# Patient Record
Sex: FEMALE | Race: WHITE | NOT HISPANIC OR LATINO | Employment: OTHER | URBAN - METROPOLITAN AREA
[De-identification: names, ages, dates, MRNs, and addresses within clinical notes are randomized per-mention and may not be internally consistent; named-entity substitution may affect disease eponyms.]

---

## 2017-11-03 ENCOUNTER — TRANSCRIBE ORDERS (OUTPATIENT)
Dept: ADMINISTRATIVE | Facility: HOSPITAL | Age: 67
End: 2017-11-03

## 2017-11-03 DIAGNOSIS — R41.82 ALTERED MENTAL STATUS, UNSPECIFIED ALTERED MENTAL STATUS TYPE: Primary | ICD-10-CM

## 2017-11-09 ENCOUNTER — APPOINTMENT (EMERGENCY)
Dept: RADIOLOGY | Facility: HOSPITAL | Age: 67
End: 2017-11-09
Payer: MEDICARE

## 2017-11-09 ENCOUNTER — HOSPITAL ENCOUNTER (EMERGENCY)
Facility: HOSPITAL | Age: 67
End: 2017-11-09
Attending: EMERGENCY MEDICINE | Admitting: EMERGENCY MEDICINE
Payer: MEDICARE

## 2017-11-09 ENCOUNTER — HOSPITAL ENCOUNTER (INPATIENT)
Facility: HOSPITAL | Age: 67
LOS: 4 days | DRG: 054 | End: 2017-11-13
Attending: INTERNAL MEDICINE | Admitting: HOSPITALIST
Payer: MEDICARE

## 2017-11-09 ENCOUNTER — HOSPITAL ENCOUNTER (OUTPATIENT)
Dept: RADIOLOGY | Facility: HOSPITAL | Age: 67
Discharge: HOME | End: 2017-11-09
Attending: FAMILY MEDICINE
Payer: MEDICARE

## 2017-11-09 VITALS
DIASTOLIC BLOOD PRESSURE: 74 MMHG | OXYGEN SATURATION: 93 % | SYSTOLIC BLOOD PRESSURE: 176 MMHG | WEIGHT: 173 LBS | HEART RATE: 78 BPM | RESPIRATION RATE: 18 BRPM | BODY MASS INDEX: 31.83 KG/M2 | TEMPERATURE: 98.1 F | HEIGHT: 62 IN

## 2017-11-09 DIAGNOSIS — E87.0 HYPERNATREMIA: ICD-10-CM

## 2017-11-09 DIAGNOSIS — R41.82 ALTERED MENTAL STATUS, UNSPECIFIED ALTERED MENTAL STATUS TYPE: ICD-10-CM

## 2017-11-09 DIAGNOSIS — G93.89 BRAIN MASS: Primary | ICD-10-CM

## 2017-11-09 DIAGNOSIS — R13.10 DYSPHAGIA: ICD-10-CM

## 2017-11-09 DIAGNOSIS — I47.2 VENTRICULAR TACHYARRHYTHMIA (HCC): ICD-10-CM

## 2017-11-09 DIAGNOSIS — R93.89 ENDOMETRIAL THICKENING ON ULTRA SOUND: ICD-10-CM

## 2017-11-09 DIAGNOSIS — I50.30 DIASTOLIC HEART FAILURE, UNSPECIFIED HEART FAILURE CHRONICITY: Chronic | ICD-10-CM

## 2017-11-09 PROBLEM — E66.9 CLASS 1 OBESITY IN ADULT: Chronic | Status: ACTIVE | Noted: 2017-11-09

## 2017-11-09 PROBLEM — I10 ACCELERATED HYPERTENSION: Status: ACTIVE | Noted: 2017-11-09

## 2017-11-09 PROBLEM — G93.40 ACUTE ENCEPHALOPATHY: Status: ACTIVE | Noted: 2017-11-09

## 2017-11-09 PROBLEM — R29.810 FACIAL DROOP: Status: ACTIVE | Noted: 2017-11-09

## 2017-11-09 PROBLEM — E66.9 CLASS 1 OBESITY IN ADULT: Status: ACTIVE | Noted: 2017-11-09

## 2017-11-09 LAB
ABO GROUP BLD: NORMAL
ALBUMIN SERPL BCP-MCNC: 3.3 G/DL (ref 3.5–5)
ALP SERPL-CCNC: 65 U/L (ref 46–116)
ALT SERPL W P-5'-P-CCNC: 25 U/L (ref 12–78)
ANION GAP SERPL CALCULATED.3IONS-SCNC: 9 MMOL/L (ref 4–13)
APTT PPP: 28 SECONDS (ref 24–33)
AST SERPL W P-5'-P-CCNC: 27 U/L (ref 5–45)
BASOPHILS # BLD AUTO: 0 THOUSANDS/ΜL (ref 0–0.1)
BASOPHILS NFR BLD AUTO: 0 % (ref 0–1)
BILIRUB SERPL-MCNC: 0.8 MG/DL (ref 0.2–1)
BLD GP AB SCN SERPL QL: NEGATIVE
BUN SERPL-MCNC: 33 MG/DL (ref 5–25)
CALCIUM SERPL-MCNC: 9.2 MG/DL (ref 8.3–10.1)
CHLORIDE SERPL-SCNC: 118 MMOL/L (ref 100–108)
CO2 SERPL-SCNC: 24 MMOL/L (ref 21–32)
CREAT SERPL-MCNC: 1.44 MG/DL (ref 0.6–1.3)
EOSINOPHIL # BLD AUTO: 0.7 THOUSAND/ΜL (ref 0–0.61)
EOSINOPHIL NFR BLD AUTO: 10 % (ref 0–6)
ERYTHROCYTE [DISTWIDTH] IN BLOOD BY AUTOMATED COUNT: 15.3 % (ref 11.6–15.1)
GFR SERPL CREATININE-BSD FRML MDRD: 37 ML/MIN/1.73SQ M
GLUCOSE SERPL-MCNC: 109 MG/DL (ref 65–140)
GLUCOSE SERPL-MCNC: 95 MG/DL (ref 65–140)
HCT VFR BLD AUTO: 40.2 % (ref 37–47)
HGB BLD-MCNC: 12.9 G/DL (ref 12–16)
INR PPP: 1.16 (ref 0.86–1.16)
LACTATE SERPL-SCNC: 0.6 MMOL/L (ref 0.5–2)
LIPASE SERPL-CCNC: 274 U/L (ref 73–393)
LYMPHOCYTES # BLD AUTO: 0.7 THOUSANDS/ΜL (ref 0.6–4.47)
LYMPHOCYTES NFR BLD AUTO: 10 % (ref 14–44)
MCH RBC QN AUTO: 28.4 PG (ref 27–31)
MCHC RBC AUTO-ENTMCNC: 32 G/DL (ref 31.4–37.4)
MCV RBC AUTO: 89 FL (ref 82–98)
MONOCYTES # BLD AUTO: 0.5 THOUSAND/ΜL (ref 0.17–1.22)
MONOCYTES NFR BLD AUTO: 6 % (ref 4–12)
NEUTROPHILS # BLD AUTO: 5.2 THOUSANDS/ΜL (ref 1.85–7.62)
NEUTS SEG NFR BLD AUTO: 73 % (ref 43–75)
NRBC BLD AUTO-RTO: 0 /100 WBCS
PLATELET # BLD AUTO: 202 THOUSANDS/UL (ref 130–400)
PMV BLD AUTO: 9.4 FL (ref 8.9–12.7)
POTASSIUM SERPL-SCNC: 3.6 MMOL/L (ref 3.5–5.3)
PROT SERPL-MCNC: 6.8 G/DL (ref 6.4–8.2)
PROTHROMBIN TIME: 12.2 SECONDS (ref 9.4–11.7)
RBC # BLD AUTO: 4.53 MILLION/UL (ref 4.2–5.4)
RH BLD: POSITIVE
SODIUM SERPL-SCNC: 151 MMOL/L (ref 136–145)
SPECIMEN EXPIRATION DATE: NORMAL
T4 FREE SERPL-MCNC: 1.17 NG/DL (ref 0.76–1.46)
TSH SERPL DL<=0.05 MIU/L-ACNC: 0.1 UIU/ML (ref 0.36–3.74)
WBC # BLD AUTO: 7.1 THOUSAND/UL (ref 4.8–10.8)

## 2017-11-09 PROCEDURE — 86850 RBC ANTIBODY SCREEN: CPT | Performed by: EMERGENCY MEDICINE

## 2017-11-09 PROCEDURE — 85610 PROTHROMBIN TIME: CPT | Performed by: EMERGENCY MEDICINE

## 2017-11-09 PROCEDURE — 96374 THER/PROPH/DIAG INJ IV PUSH: CPT

## 2017-11-09 PROCEDURE — 70553 MRI BRAIN STEM W/O & W/DYE: CPT

## 2017-11-09 PROCEDURE — 82948 REAGENT STRIP/BLOOD GLUCOSE: CPT

## 2017-11-09 PROCEDURE — 71010 HB CHEST X-RAY 1 VIEW FRONTAL (PORTABLE): CPT

## 2017-11-09 PROCEDURE — 85025 COMPLETE CBC W/AUTO DIFF WBC: CPT | Performed by: EMERGENCY MEDICINE

## 2017-11-09 PROCEDURE — 80053 COMPREHEN METABOLIC PANEL: CPT | Performed by: EMERGENCY MEDICINE

## 2017-11-09 PROCEDURE — 83690 ASSAY OF LIPASE: CPT | Performed by: EMERGENCY MEDICINE

## 2017-11-09 PROCEDURE — 84443 ASSAY THYROID STIM HORMONE: CPT | Performed by: EMERGENCY MEDICINE

## 2017-11-09 PROCEDURE — 83605 ASSAY OF LACTIC ACID: CPT | Performed by: EMERGENCY MEDICINE

## 2017-11-09 PROCEDURE — 70450 CT HEAD/BRAIN W/O DYE: CPT

## 2017-11-09 PROCEDURE — 93005 ELECTROCARDIOGRAM TRACING: CPT | Performed by: EMERGENCY MEDICINE

## 2017-11-09 PROCEDURE — 99285 EMERGENCY DEPT VISIT HI MDM: CPT

## 2017-11-09 PROCEDURE — 71250 CT THORAX DX C-: CPT

## 2017-11-09 PROCEDURE — 36415 COLL VENOUS BLD VENIPUNCTURE: CPT | Performed by: EMERGENCY MEDICINE

## 2017-11-09 PROCEDURE — 96361 HYDRATE IV INFUSION ADD-ON: CPT

## 2017-11-09 PROCEDURE — 86900 BLOOD TYPING SEROLOGIC ABO: CPT | Performed by: EMERGENCY MEDICINE

## 2017-11-09 PROCEDURE — 84439 ASSAY OF FREE THYROXINE: CPT | Performed by: EMERGENCY MEDICINE

## 2017-11-09 PROCEDURE — 74176 CT ABD & PELVIS W/O CONTRAST: CPT

## 2017-11-09 PROCEDURE — A9585 GADOBUTROL INJECTION: HCPCS | Performed by: EMERGENCY MEDICINE

## 2017-11-09 PROCEDURE — 86901 BLOOD TYPING SEROLOGIC RH(D): CPT | Performed by: EMERGENCY MEDICINE

## 2017-11-09 PROCEDURE — 85730 THROMBOPLASTIN TIME PARTIAL: CPT | Performed by: EMERGENCY MEDICINE

## 2017-11-09 RX ORDER — GLYCOPYRROLATE 1 MG/5ML
SOLUTION ORAL 3 TIMES DAILY
COMMUNITY
End: 2017-11-13 | Stop reason: HOSPADM

## 2017-11-09 RX ORDER — ALLOPURINOL 300 MG/1
300 TABLET ORAL DAILY
Status: DISCONTINUED | OUTPATIENT
Start: 2017-11-10 | End: 2017-11-13 | Stop reason: HOSPADM

## 2017-11-09 RX ORDER — SODIUM CHLORIDE 9 MG/ML
75 INJECTION, SOLUTION INTRAVENOUS CONTINUOUS
Status: DISCONTINUED | OUTPATIENT
Start: 2017-11-09 | End: 2017-11-10

## 2017-11-09 RX ORDER — DEXAMETHASONE SODIUM PHOSPHATE 10 MG/ML
10 INJECTION, SOLUTION INTRAMUSCULAR; INTRAVENOUS ONCE
Status: COMPLETED | OUTPATIENT
Start: 2017-11-09 | End: 2017-11-09

## 2017-11-09 RX ORDER — LABETALOL HYDROCHLORIDE 5 MG/ML
10 INJECTION, SOLUTION INTRAVENOUS EVERY 4 HOURS PRN
Status: DISCONTINUED | OUTPATIENT
Start: 2017-11-09 | End: 2017-11-13 | Stop reason: HOSPADM

## 2017-11-09 RX ORDER — MONTELUKAST SODIUM 10 MG/1
10 TABLET ORAL
Status: DISCONTINUED | OUTPATIENT
Start: 2017-11-09 | End: 2017-11-10

## 2017-11-09 RX ORDER — DEXAMETHASONE SODIUM PHOSPHATE 4 MG/ML
4 INJECTION, SOLUTION INTRA-ARTICULAR; INTRALESIONAL; INTRAMUSCULAR; INTRAVENOUS; SOFT TISSUE EVERY 6 HOURS SCHEDULED
Status: DISPENSED | OUTPATIENT
Start: 2017-11-09 | End: 2017-11-12

## 2017-11-09 RX ORDER — CALCITRIOL 0.25 UG/1
0.25 CAPSULE, LIQUID FILLED ORAL DAILY
Status: DISCONTINUED | OUTPATIENT
Start: 2017-11-10 | End: 2017-11-13 | Stop reason: HOSPADM

## 2017-11-09 RX ORDER — ONDANSETRON 2 MG/ML
4 INJECTION INTRAMUSCULAR; INTRAVENOUS EVERY 4 HOURS PRN
Status: DISCONTINUED | OUTPATIENT
Start: 2017-11-09 | End: 2017-11-13 | Stop reason: HOSPADM

## 2017-11-09 RX ORDER — ACETAMINOPHEN 325 MG/1
650 TABLET ORAL EVERY 6 HOURS PRN
Status: DISCONTINUED | OUTPATIENT
Start: 2017-11-09 | End: 2017-11-13 | Stop reason: HOSPADM

## 2017-11-09 RX ORDER — HYDRALAZINE HYDROCHLORIDE 20 MG/ML
10 INJECTION INTRAMUSCULAR; INTRAVENOUS ONCE
Status: DISCONTINUED | OUTPATIENT
Start: 2017-11-09 | End: 2017-11-09 | Stop reason: HOSPADM

## 2017-11-09 RX ORDER — ROPINIROLE 1 MG/1
0.5 TABLET, FILM COATED ORAL 3 TIMES DAILY
Status: DISCONTINUED | OUTPATIENT
Start: 2017-11-09 | End: 2017-11-10

## 2017-11-09 RX ORDER — CHOLECALCIFEROL (VITAMIN D3) 125 MCG
1000 CAPSULE ORAL DAILY
Status: DISCONTINUED | OUTPATIENT
Start: 2017-11-10 | End: 2017-11-12

## 2017-11-09 RX ORDER — GLYCOPYRROLATE 1 MG/5ML
SOLUTION ORAL 3 TIMES DAILY
Status: DISCONTINUED | OUTPATIENT
Start: 2017-11-09 | End: 2017-11-09

## 2017-11-09 RX ORDER — CARBIDOPA AND LEVODOPA 25; 100 MG/1; MG/1
1 TABLET, ORALLY DISINTEGRATING ORAL
COMMUNITY
End: 2017-11-13 | Stop reason: HOSPADM

## 2017-11-09 RX ORDER — POLYETHYLENE GLYCOL 3350 17 G/17G
17 POWDER, FOR SOLUTION ORAL DAILY
Status: DISCONTINUED | OUTPATIENT
Start: 2017-11-10 | End: 2017-11-12

## 2017-11-09 RX ORDER — FLUTICASONE PROPIONATE 50 MCG
1 SPRAY, SUSPENSION (ML) NASAL 2 TIMES DAILY
Status: DISCONTINUED | OUTPATIENT
Start: 2017-11-09 | End: 2017-11-13 | Stop reason: HOSPADM

## 2017-11-09 RX ORDER — LISINOPRIL 10 MG/1
10 TABLET ORAL DAILY
Status: DISCONTINUED | OUTPATIENT
Start: 2017-11-10 | End: 2017-11-10

## 2017-11-09 RX ORDER — SENNOSIDES 8.6 MG
1 TABLET ORAL DAILY
Status: DISCONTINUED | OUTPATIENT
Start: 2017-11-10 | End: 2017-11-12

## 2017-11-09 RX ORDER — FUROSEMIDE 40 MG/1
40 TABLET ORAL
Status: DISCONTINUED | OUTPATIENT
Start: 2017-11-10 | End: 2017-11-10

## 2017-11-09 RX ORDER — ONDANSETRON 4 MG/1
4 TABLET, FILM COATED ORAL EVERY 6 HOURS PRN
COMMUNITY

## 2017-11-09 RX ORDER — DOCUSATE SODIUM 100 MG/1
100 CAPSULE, LIQUID FILLED ORAL 2 TIMES DAILY
Status: DISCONTINUED | OUTPATIENT
Start: 2017-11-09 | End: 2017-11-12

## 2017-11-09 RX ORDER — LEVOTHYROXINE SODIUM 0.07 MG/1
75 TABLET ORAL
Status: DISCONTINUED | OUTPATIENT
Start: 2017-11-10 | End: 2017-11-10

## 2017-11-09 RX ORDER — TRAMADOL HYDROCHLORIDE 50 MG/1
50 TABLET ORAL EVERY 6 HOURS PRN
Status: DISCONTINUED | OUTPATIENT
Start: 2017-11-09 | End: 2017-11-09

## 2017-11-09 RX ADMIN — SODIUM CHLORIDE 75 ML/HR: 0.9 INJECTION, SOLUTION INTRAVENOUS at 20:49

## 2017-11-09 RX ADMIN — GADOBUTROL 7.85 ML: 604.72 INJECTION INTRAVENOUS at 14:23

## 2017-11-09 RX ADMIN — SODIUM CHLORIDE 1000 ML: 0.9 INJECTION, SOLUTION INTRAVENOUS at 10:34

## 2017-11-09 RX ADMIN — LABETALOL 20 MG/4 ML (5 MG/ML) INTRAVENOUS SYRINGE 10 MG: at 23:37

## 2017-11-09 RX ADMIN — DEXAMETHASONE SODIUM PHOSPHATE 10 MG: 10 INJECTION, SOLUTION INTRAMUSCULAR; INTRAVENOUS at 10:33

## 2017-11-09 RX ADMIN — DEXAMETHASONE SODIUM PHOSPHATE 4 MG: 4 INJECTION, SOLUTION INTRAMUSCULAR; INTRAVENOUS at 23:29

## 2017-11-09 NOTE — EMTALA/ACUTE CARE TRANSFER
[facility] EMERGENCY DEPARTMENT  [address] [facility] [address]  Dept: [phone]      EMTALA TRANSFER CONSENT    NAME Darien Luna                                         1950                              MRN [id number]    I have been informed of my rights regarding examination, treatment, and transfer   by Dr Abel Donohue MD    Benefits: Specialized equipment and/or services available at the receiving facility (Include comment)________________________    Risks:        Consent for Transfer:  I acknowledge that my medical condition has been evaluated and explained to me by the emergency department physician or other qualified medical person and/or my attending physician, who has recommended that I be transferred to the service of  Accepting Physician: Dr Chidi Berman  at [address] [place] [address] Name, [address] : [facility]  The above potential benefits of such transfer, the potential risks associated with such transfer, and the probable risks of not being transferred have been explained to me, and I fully understand them  The doctor has explained that, in my case, the benefits of transfer outweigh the risks  I agree to be transferred  I authorize the performance of emergency medical procedures and treatments upon me in both transit and upon arrival at the receiving facility  Additionally, I authorize the release of any and all medical records to the receiving facility and request they be transported with me, if possible  I understand that the safest mode of transportation during a medical emergency is an ambulance and that the Hospital advocates the use of this mode of transport  Risks of traveling to the receiving facility by car, including absence of medical control, life sustaining equipment, such as oxygen, and medical personnel has been explained to me and I fully understand them      (EDWARD CORRECT BOX BELOW)  [  ]  I consent to the stated transfer and to be transported by ambulance/helicopter  [  ]  I consent to the stated transfer, but refuse transportation by ambulance and accept full responsibility for my transportation by car  I understand the risks of non-ambulance transfers and I exonerate the Hospital and its staff from any deterioration in my condition that results from this refusal     X___________________________________________    DATE  17  TIME________  Signature of patient or legally responsible individual signing on patient behalf           RELATIONSHIP TO PATIENT_________________________          Provider Certification    NAME Angel Lockwood                                         1950                              MRN [id number]    A medical screening exam was performed on the above named patient  Based on the examination:    Condition Necessitating Transfer The primary encounter diagnosis was Brain mass  A diagnosis of Dysphagia was also pertinent to this visit      Patient Condition: The patient has been stabilized such that within reasonable medical probability, no material deterioration of the patient condition or the condition of the unborn child(anibal) is likely to result from the transfer, An emergency transfer is being made prior to stabilization due to the need for definitive care and the benefit of transfer outweighs the risk    Reason for Transfer: Level of Care needed not available at this facility (Neurosurgery)    Transfer Requirements: [facility]   · Space available and qualified personnel available for treatment as acknowledged by    · Agreed to accept transfer and to provide appropriate medical treatment as acknowledged by       Dr Jannie Davalos   · Appropriate medical records of the examination and treatment of the patient are provided at the time of transfer   [address] _______  · Transfer will be performed by qualified personnel from [facility]   and appropriate transfer equipment as required, including the use of necessary and appropriate life support measures  Provider Certification: I have examined the patient and explained the following risks and benefits of being transferred/refusing transfer to the patient/family:  General risk, such as traffic hazards, adverse weather conditions, rough terrain or turbulence, possible failure of equipment (including vehicle or aircraft), or consequences of actions of persons outside the control of the transport personnel, Unanticipated needs of medical equipment and personnel during transport, Risk of worsening condition, The possibility of a transport vehicle being unavailable      Based on these reasonable risks and benefits to the patient and/or the unborn child(anibal), and based upon the information available at the time of the patients examination, I certify that the medical benefits reasonably to be expected from the provision of appropriate medical treatments at another medical facility outweigh the increasing risks, if any, to the individuals medical condition, and in the case of labor to the unborn child, from effecting the transfer      X____________________________________________ DATE 11/09/17        TIME_______      ORIGINAL - SEND TO MEDICAL RECORDS   COPY - SEND WITH PATIENT DURING TRANSFER

## 2017-11-09 NOTE — ED PROVIDER NOTES
History  Chief Complaint   Patient presents with    Mass     pt here for ct scan this morning  brain mass seen     18-year-old female, history of Alzheimer's, hypothyroidism, CRI, nursing home resident ([facility])referred to the ED by PCP (Dr Carlos Leahy) for evaluation after obtaining outpatient non-contrast CT of the head today as part of work-up for worsening left-sided weakness, worsening dysphagia  Dr Carlos Leahy would like further eval of abnormal CT, probable NSx consult, vol status, and need for PEG  On arrival and at baseline the patient is somnolent,  but responds to verbal stimuli, confused at times, globally weak, left side more than right side  Pt is handling secretions, airway is patent and intact  She also appearst least moderately dehydrated  History provided by:  Patient (PCP, Dr Carlos Leahy )   used: No        Prior to Admission Medications   Prescriptions Last Dose Informant Patient Reported? Taking?    Efinaconazole (JUBLIA) 10 % SOLN   Yes Yes   Sig: Apply topically   Glycopyrrolate (CUVPOSA) 1 MG/5ML SOLN   Yes Yes   Sig: Take by mouth 3 (three) times a day   allopurinol (ZYLOPRIM) 300 mg tablet   Yes No   Sig: Take 300 mg by mouth daily   calcitriol (ROCALTROL) 0 25 mcg capsule   Yes No   Sig: Take 0 25 mcg by mouth daily   carbidopa-levodopa (PARCOPA)  mg per disintegrating tablet   Yes Yes   Sig: Take 1 tablet by mouth 5 (five) times a day   cyanocobalamin (VITAMIN B-12) 1,000 mcg tablet   Yes No   Sig: Take 1,000 mcg by mouth daily   docusate sodium (COLACE) 100 mg capsule   Yes No   Sig: Take 100 mg by mouth 2 (two) times a day   fluticasone (VERAMYST) 27 5 MCG/SPRAY nasal spray   Yes No   Si sprays into each nostril daily   furosemide (LASIX) 40 mg tablet   Yes No   Sig: Take 40 mg by mouth 2 (two) times a day   levothyroxine 75 mcg tablet   Yes No   Sig: Take 75 mcg by mouth daily   lisinopril (ZESTRIL) 10 mg tablet   Yes No   Sig: Take 10 mg by mouth daily montelukast (SINGULAIR) 10 mg tablet   Yes No   Sig: Take 10 mg by mouth daily at bedtime   ondansetron (ZOFRAN) 4 mg tablet   Yes Yes   Sig: Take 4 mg by mouth every 6 (six) hours as needed for nausea or vomiting   rOPINIRole (REQUIP) 0 5 mg tablet   Yes No   Sig: Take 0 5 mg by mouth 3 (three) times a day   traMADol (ULTRAM) 50 mg tablet   Yes No   Sig: Take 50 mg by mouth every 6 (six) hours as needed for moderate pain      Facility-Administered Medications: None       Past Medical History:   Diagnosis Date    Arthritis     Edema leg     Gout     Hypertension     Kidney disease     Parkinson disease ([facility] [address] )        Past Surgical History:   Procedure Laterality Date    CHOLECYSTECTOMY LAPAROSCOPIC N/A 11/30/2016    Procedure: CHOLECYSTECTOMY LAPAROSCOPIC;  Surgeon: Ale Thompson MD;  Location: [address] [place] [address];  Service:        History reviewed  No pertinent family history  I have reviewed and agree with the history as documented  Social History   Substance Use Topics    Smoking status: Unknown If Ever Smoked    Smokeless tobacco: Not on file    Alcohol use Not on file        Review of Systems   Unable to perform ROS: Mental status change       Physical Exam  ED Triage Vitals [11/09/17 0949]   Temperature Pulse Respirations Blood Pressure SpO2   98 1 °F (36 7 °C) 73 18 162/74 96 %      Temp Source Heart Rate Source Patient Position - Orthostatic VS BP Location FiO2 (%)   Tympanic Monitor Lying Left arm --      Pain Score       9           Orthostatic Vital Signs  Vitals:    11/09/17 0949 11/09/17 1045   BP: 162/74    Pulse: 73 64   Patient Position - Orthostatic VS: Lying        Physical Exam   Constitutional: She appears well-developed and well-nourished  No distress  HENT:   Head: Normocephalic and atraumatic  Mouth/Throat: Oropharynx is clear and moist    Eyes: EOM are normal  Pupils are equal, round, and reactive to light  Neck: Normal range of motion  Neck supple     Cardiovascular: Normal rate, regular rhythm and intact distal pulses  Pulmonary/Chest: Effort normal and breath sounds normal    Abdominal: Soft  She exhibits no distension  There is no tenderness  Musculoskeletal: Normal range of motion  1/5 (at best) lt sided strength upper and lower  2+ right sided   Neurological:   Somnolent, but responsive to verbal stimuli   Left surgical pupil, non-reactive to light  2 mm Rt pupil, reactive to light  Globally weak     Skin: Skin is warm and dry  She is not diaphoretic  Nursing note and vitals reviewed  ED Medications  Medications   dexamethasone (PF) (DECADRON) injection 10 mg (10 mg Intravenous Given 11/9/17 1033)   sodium chloride 0 9 % bolus 1,000 mL (1,000 mL Intravenous New Bag 11/9/17 1034)   Gadobutrol injection (SINGLE-DOSE) SOLN 7 85 mL (7 85 mL Intravenous Given 11/9/17 1423)       Diagnostic Studies  Results Reviewed     Procedure Component Value Units Date/Time    T4, free [[id number]]  (Normal) Collected:  11/09/17 1026    Lab Status:  Final result Specimen:  Blood from Hand, Right Updated:  11/09/17 1402     Free T4 1 17 ng/dL     TSH, 3rd generation with T4 reflex [[id number]]  (Abnormal) Collected:  11/09/17 1026    Lab Status:  Final result Specimen:  Blood from Hand, Right Updated:  11/09/17 1121     TSH 3RD GENERATON 0 103 (L) uIU/mL     Narrative:         Patients undergoing fluorescein dye angiography may retain small amounts of fluorescein in the body for 48-72 hours post procedure  Samples containing fluorescein can produce falsely depressed TSH values  If the patient had this procedure,a specimen should be resubmitted post fluorescein clearance            The recommended reference ranges for TSH during pregnancy are as follows:  First trimester 0 1 to 2 5 uIU/mL  Second trimester  0 2 to 3 0 uIU/mL  Third trimester 0 3 to 3 0 uIU/m      Lactic acid, plasma [[id number]]  (Normal) Collected:  11/09/17 1026    Lab Status:  Final result Specimen:  Blood from Hand, Right Updated:  11/09/17 1115     LACTIC ACID 0 6 mmol/L     Narrative:         Result may be elevated if tourniquet was used during collection  Comprehensive metabolic panel [[id number]]  (Abnormal) Collected:  11/09/17 1026    Lab Status:  Final result Specimen:  Blood from Hand, Right Updated:  11/09/17 1111     Sodium 151 (H) mmol/L      Potassium 3 6 mmol/L      Chloride 118 (H) mmol/L      CO2 24 mmol/L      Anion Gap 9 mmol/L      BUN 33 (H) mg/dL      Creatinine 1 44 (H) mg/dL      Glucose 109 mg/dL      Calcium 9 2 mg/dL      AST 27 U/L      ALT 25 U/L      Alkaline Phosphatase 65 U/L      Total Protein 6 8 g/dL      Albumin 3 3 (L) g/dL      Total Bilirubin 0 80 mg/dL      eGFR 37 ml/min/1 73sq m     Narrative:         National Kidney Disease Education Program recommendations are as follows:  GFR calculation is accurate only with a steady state creatinine  Chronic Kidney disease less than 60 ml/min/1 73 sq  meters  Kidney failure less than 15 ml/min/1 73 sq  meters  Lipase [[id number]]  (Normal) Collected:  11/09/17 1026    Lab Status:  Final result Specimen:  Blood from Hand, Right Updated:  11/09/17 1111     Lipase 274 u/L     Protime-INR [[id number]]  (Abnormal) Collected:  11/09/17 1026    Lab Status:  Final result Specimen:  Blood from Arm, Right Updated:  11/09/17 1108     Protime 12 2 (H) seconds      INR 1 16    APTT [[id number]]  (Normal) Collected:  11/09/17 1026    Lab Status:  Final result Specimen:  Blood from Arm, Right Updated:  11/09/17 1108     PTT 28 seconds     Narrative:          Therapeutic Heparin Range = 60-90 seconds    CBC and differential [[id number]]  (Abnormal) Collected:  11/09/17 1026    Lab Status:  Final result Specimen:  Blood from Hand, Right Updated:  11/09/17 1046     WBC 7 10 Thousand/uL      RBC 4 53 Million/uL      Hemoglobin 12 9 g/dL      Hematocrit 40 2 %      MCV 89 fL      MCH 28 4 pg      MCHC 32 0 g/dL      RDW 15 3 (H) %      MPV 9 4 fL      Platelets 295 Thousands/uL nRBC 0 /100 WBCs      Neutrophils Relative 73 %      Lymphocytes Relative 10 (L) %      Monocytes Relative 6 %      Eosinophils Relative 10 (H) %      Basophils Relative 0 %      Neutrophils Absolute 5 20 Thousands/µL      Lymphocytes Absolute 0 70 Thousands/µL      Monocytes Absolute 0 50 Thousand/µL      Eosinophils Absolute 0 70 (H) Thousand/µL      Basophils Absolute 0 00 Thousands/µL     Fingerstick Glucose (POCT) [[id number]]  (Normal) Collected:  11/09/17 1009    Lab Status:  Final result Updated:  11/09/17 1011     POC Glucose 95 mg/dl     UA w Reflex to Microscopic w Reflex to Culture [[id number]]     Lab Status:  No result Specimen:  Urine                  CT chest abdomen pelvis wo contrast   Final Result by Jose David Orta MD (11/09 1540)      1  Examination limited by lack of intravenous and gastrointestinal contrast    2   No evidence of primary malignancy or metastases in the chest, abdomen, pelvis or included portions of the skeleton  3   Enlarged uterus due to uterine fibroids  Also, fluid in the uterine cavity versus endometrial thickening  Endometrial carcinoma should be considered  This can be evaluated more accurately with MRI of the pelvis, without and with intravenous    contrast                   Workstation performed: [id number]         MRI brain w wo contrast   Final Result by Enoc Batista MD (11/09 1452)      3 intracranial lesions as described varying from 13 to 35 mm in greatest linear dimension  Local mass effect, no hydrocephalus  Central necrosis and minor hemorrhage are noted  Findings consistent with metastatic disease, query melanoma  Workstation performed: [id number]         XR chest 1 view portable   Final Result by Bentley Ding DO (11/09 3224)   1  Limited examination due to incomplete evaluation of the left hemithorax  2   Diminished inspiration  No active disease           Workstation performed: [id number] Procedures  Procedures       Phone Contacts  ED Phone Contact    ED Course  ED Course                                MDM  Number of Diagnoses or Management Options  Brain mass: new and requires workup  Dysphagia: new and requires workup  Diagnosis management comments: R/o malignancy (primary vs metastatic disease), abscess, sepsis, dehydration, metabolic/electrolyte abnormality  - POC glucose  - EKG  - Labs  - UA  - CXR  - NSx, MICU consult  - NPO  - IVF  - IV steroids  - Admit, transfer        Amount and/or Complexity of Data Reviewed  Clinical lab tests: ordered and reviewed  Tests in the radiology section of CPT®: ordered and reviewed  Tests in the medicine section of CPT®: reviewed and ordered  Decide to obtain previous medical records or to obtain history from someone other than the patient: yes  Obtain history from someone other than the patient: yes (Dr Alexandra Norwood (PCP))  Review and summarize past medical records: yes  Discuss the patient with other providers: yes (Dr Alexandra Norwood (PCP)    Demarcus REAVES - recommended f/u MRI, CT c/a/p as part of w/u, agreed with steroids, will consult on transfer   MICU - agrees with transfer to stepdown, will re-eval on transfer for need to upgrade  Dr Kale Soto - agrees with current eval, plan to MRI, CT, ok to transfer to stepdown, will re-eval when she arrives there)  Independent visualization of images, tracings, or specimens: yes      CritCare Time    Disposition  Final diagnoses:   None     ED Disposition     None      Follow-up Information    None       Patient's Medications   Discharge Prescriptions    No medications on file     No discharge procedures on file      ED Provider  Electronically Signed by           Savanna Barajas MD  11/09/17 3016

## 2017-11-10 ENCOUNTER — APPOINTMENT (INPATIENT)
Dept: RADIOLOGY | Facility: HOSPITAL | Age: 67
DRG: 054 | End: 2017-11-10
Payer: MEDICARE

## 2017-11-10 ENCOUNTER — APPOINTMENT (INPATIENT)
Dept: NON INVASIVE DIAGNOSTICS | Facility: HOSPITAL | Age: 67
DRG: 054 | End: 2017-11-10
Payer: MEDICARE

## 2017-11-10 LAB
ANION GAP SERPL CALCULATED.3IONS-SCNC: 11 MMOL/L (ref 4–13)
ATRIAL RATE: 68 BPM
BUN SERPL-MCNC: 32 MG/DL (ref 5–25)
CALCIUM SERPL-MCNC: 9.5 MG/DL (ref 8.3–10.1)
CHLORIDE SERPL-SCNC: 120 MMOL/L (ref 100–108)
CO2 SERPL-SCNC: 20 MMOL/L (ref 21–32)
CREAT SERPL-MCNC: 1.46 MG/DL (ref 0.6–1.3)
ERYTHROCYTE [DISTWIDTH] IN BLOOD BY AUTOMATED COUNT: 14.7 % (ref 11.6–15.1)
GFR SERPL CREATININE-BSD FRML MDRD: 37 ML/MIN/1.73SQ M
GLUCOSE SERPL-MCNC: 114 MG/DL (ref 65–140)
GLUCOSE SERPL-MCNC: 128 MG/DL (ref 65–140)
HCT VFR BLD AUTO: 39 % (ref 34.8–46.1)
HGB BLD-MCNC: 13 G/DL (ref 11.5–15.4)
MAGNESIUM SERPL-MCNC: 2.4 MG/DL (ref 1.6–2.6)
MCH RBC QN AUTO: 29.2 PG (ref 26.8–34.3)
MCHC RBC AUTO-ENTMCNC: 33.3 G/DL (ref 31.4–37.4)
MCV RBC AUTO: 88 FL (ref 82–98)
P AXIS: 44 DEGREES
PLATELET # BLD AUTO: 170 THOUSANDS/UL (ref 149–390)
PMV BLD AUTO: 12.3 FL (ref 8.9–12.7)
POTASSIUM SERPL-SCNC: 4.2 MMOL/L (ref 3.5–5.3)
PR INTERVAL: 162 MS
QRS AXIS: -22 DEGREES
QRSD INTERVAL: 70 MS
QT INTERVAL: 328 MS
QTC INTERVAL: 348 MS
RBC # BLD AUTO: 4.45 MILLION/UL (ref 3.81–5.12)
SODIUM SERPL-SCNC: 149 MMOL/L (ref 136–145)
SODIUM SERPL-SCNC: 151 MMOL/L (ref 136–145)
SODIUM SERPL-SCNC: 152 MMOL/L (ref 136–145)
T WAVE AXIS: -15 DEGREES
VENTRICULAR RATE: 68 BPM
WBC # BLD AUTO: 9.71 THOUSAND/UL (ref 4.31–10.16)

## 2017-11-10 PROCEDURE — 83735 ASSAY OF MAGNESIUM: CPT | Performed by: HOSPITALIST

## 2017-11-10 PROCEDURE — 93306 TTE W/DOPPLER COMPLETE: CPT

## 2017-11-10 PROCEDURE — 92610 EVALUATE SWALLOWING FUNCTION: CPT

## 2017-11-10 PROCEDURE — 82948 REAGENT STRIP/BLOOD GLUCOSE: CPT

## 2017-11-10 PROCEDURE — 85027 COMPLETE CBC AUTOMATED: CPT | Performed by: HOSPITALIST

## 2017-11-10 PROCEDURE — 84295 ASSAY OF SERUM SODIUM: CPT | Performed by: INTERNAL MEDICINE

## 2017-11-10 PROCEDURE — 74000 HB X-RAY EXAM OF ABDOMEN (SINGLE ANTEROPOSTERIOR VIEW) (PORTABLE): CPT

## 2017-11-10 PROCEDURE — 80048 BASIC METABOLIC PNL TOTAL CA: CPT | Performed by: HOSPITALIST

## 2017-11-10 RX ORDER — MONTELUKAST SODIUM 10 MG/1
10 TABLET ORAL
Status: DISCONTINUED | OUTPATIENT
Start: 2017-11-11 | End: 2017-11-12

## 2017-11-10 RX ORDER — POTASSIUM CHLORIDE AND SODIUM CHLORIDE 450; 150 MG/100ML; MG/100ML
75 INJECTION, SOLUTION INTRAVENOUS CONTINUOUS
Status: DISCONTINUED | OUTPATIENT
Start: 2017-11-10 | End: 2017-11-11

## 2017-11-10 RX ORDER — LEVOTHYROXINE SODIUM 0.07 MG/1
75 TABLET ORAL
Status: DISCONTINUED | OUTPATIENT
Start: 2017-11-11 | End: 2017-11-12

## 2017-11-10 RX ORDER — ROPINIROLE 1 MG/1
0.5 TABLET, FILM COATED ORAL 3 TIMES DAILY
Status: DISCONTINUED | OUTPATIENT
Start: 2017-11-11 | End: 2017-11-12

## 2017-11-10 RX ADMIN — LEVETIRACETAM 500 MG: 100 INJECTION, SOLUTION INTRAVENOUS at 22:18

## 2017-11-10 RX ADMIN — POTASSIUM CHLORIDE AND SODIUM CHLORIDE 75 ML/HR: 450; 150 INJECTION, SOLUTION INTRAVENOUS at 11:36

## 2017-11-10 RX ADMIN — DOCUSATE SODIUM 100 MG: 100 CAPSULE, LIQUID FILLED ORAL at 10:01

## 2017-11-10 RX ADMIN — LABETALOL 20 MG/4 ML (5 MG/ML) INTRAVENOUS SYRINGE 10 MG: at 19:06

## 2017-11-10 RX ADMIN — DEXAMETHASONE SODIUM PHOSPHATE 4 MG: 4 INJECTION, SOLUTION INTRAMUSCULAR; INTRAVENOUS at 19:06

## 2017-11-10 RX ADMIN — SENNOSIDES 8.6 MG: 8.6 TABLET, FILM COATED ORAL at 10:01

## 2017-11-10 RX ADMIN — DEXAMETHASONE SODIUM PHOSPHATE 4 MG: 4 INJECTION, SOLUTION INTRAMUSCULAR; INTRAVENOUS at 14:08

## 2017-11-10 RX ADMIN — DEXAMETHASONE SODIUM PHOSPHATE 4 MG: 4 INJECTION, SOLUTION INTRAMUSCULAR; INTRAVENOUS at 05:13

## 2017-11-10 NOTE — CASE MANAGEMENT
Initial Clinical Review    Admission: Date/Time/Statement: 11/9/17 @ 2016    Orders Placed This Encounter   Procedures    Inpatient Admission     Standing Status:   Standing     Number of Occurrences:   1     Order Specific Question:   Admitting Physician     Answer:   Reginaldo Leak     Order Specific Question:   Level of Care     Answer:   Med Surg [16]     Order Specific Question:   Estimated length of stay     Answer:   More than 2 Midnights     Order Specific Question:   Certification     Answer:   I certify that inpatient services are medically necessary for this patient for a duration of greater than two midnights  See H&P and MD Progress Notes for additional information about the patient's course of treatment  Date/Time/Mode of Arrival: 11/9 Transfer from [facility]    Chief Complaint: Dysphagia, left-sided weakness    History of Illness: 75 y o  female with history of Parkinson, hypothyroidism, dHF who originally was referred by her PCP , Dr Griselda Sherwood, to the [facility] for further evaluation Of abnormal CT head findings of two centrally necrotic masses within the right hemisphere with vasogenic edema  The patient was complaining on left-sided weakness ,dysphagia   and facial asymmetry so PCP proceed with CT of the head  Upon arrival to the ER she found to be dehydrated with elevated sodium, somnolent but responded to verbal stimuli  [facility] ER proceeded with MRI with and without contrast which reported 3 intracranial lesions varying from 13 to 35 mm with central necrosis, minor hemorrhage  and local mass effect  Finding consistent with metastatic disease ,? Melanoma  CT of abdomen showed enlarged uterus due to uterine fibroids, fluid in the uterine cavity versus endometrial thickening  ? Endometrial  Carcinoma    Given significant findings on imaging study and lack of neurosurgery service at [facility] patient was transferred to [facility]   blood pressure on admission 200/101, afebrile  Admitted on an inpatient basis to step-down 2 level  ED Vital Signs:   ED Triage Vitals   Temperature Pulse Respirations Blood Pressure SpO2   11/09/17 1900 11/09/17 1900 11/09/17 1900 11/09/17 1900 11/09/17 1900   98 2 °F (36 8 °C) 68 20 155/79 95 %      Temp Source Heart Rate Source Patient Position - Orthostatic VS BP Location FiO2 (%)   11/09/17 1900 11/09/17 2322 11/09/17 1900 11/09/17 1900 --   Oral Monitor Lying Right arm       Pain Score       --               Wt Readings from Last 1 Encounters:   11/09/17 78 3 kg (172 lb 9 9 oz)     Vital Signs (abnormal): WNL    Abnormal Labs:   Updated: 11/09/17 1111       Sodium 151 (H) 136 - 145 mmol/L      BUN 32 (H) 5 - 25 mg/dL     Creatinine 1 46 (H) 0 60 - 1 30 mg/dL      Diagnostic Test Results: MRI Brain - 3 intracranial lesions as described varying from 13 to 35 mm in greatest linear dimension  Local mass effect, no hydrocephalus  Central necrosis and minor hemorrhage are noted  Findings consistent with metastatic disease, query melanoma  CT Chest/ Abd/ Pelvis -   1  Examination limited by lack of intravenous and gastrointestinal contrast   2   No evidence of primary malignancy or metastases in the chest, abdomen, pelvis or included portions of the skeleton  3   Enlarged uterus due to uterine fibroids  Also, fluid in the uterine cavity versus endometrial thickening  Endometrial carcinoma should be considered  This can be evaluated more accurately with MRI of the pelvis, without and with intravenous   contrast      Past Medical/Surgical History:    Active Ambulatory Problems     Diagnosis Date Noted    Pancreatitis 11/22/2016    Benign essential hypertension 11/22/2016    Chronic kidney disease, stage III (moderate) 11/22/2016    Hypothyroidism 11/22/2016    Symptomatic Parkinson disease ([facility] [address] ) 11/22/2016    Gout 11/22/2016     Resolved Ambulatory Problems     Diagnosis Date Noted    No Resolved Ambulatory Problems     Past Medical History:   Diagnosis Date    Arthritis     Edema leg     Gout     Hypertension     Kidney disease     Parkinson disease ([facility] [address] )      Admitting Diagnosis: Brain mass [G93 9]    Age/Sex: 75 y o  female    Assessment/Plan:   Hospital Problem List:      Principal Problem:    Brain mass  Active Problems:    Chronic kidney disease, stage III (moderate)    Hypothyroidism    Symptomatic Parkinson disease (HCC)    Gout    Accelerated hypertension    Hypernatremia    Dysphagia    Acute encephalopathy    Facial droop    Class 1 obesity in adult    Diastolic heart failure ([facility] [address] )      Plan for the Primary Problem(s):  · Brain masses worrisome for metastatic process  · Continue Decadron IV  ? Neurochecks q 1 hour  ? Aspiration and fall ,seizure precautions  ? NPO  Given NPO status  p o  meds converted to IV forms  ? IV fluids  ? Pain management  ? Speech and swallow evaluation     Plan for Additional Problems:   · Parkinson  Continue Rx  · Hypothyroidism Synthroid  · Accelerated hypertension labetalol p r n  Ordered  · Diastolic heart failure will obtain echocardiogram     VTE Prophylaxis: Pharmacologic VTE Prophylaxis contraindicated due to Brain Mets with vasogenic edema and hemorrhage  / sequential compression device   Code Status: full  POLST: There is no POLST form on file for this patient (pre-hospital)     Anticipated Length of Stay:  Patient will be admitted on an Inpatient basis with an anticipated length of stay of  > 2 midnights     Justification for Hospital Stay:  Neurosurgeon evaluation      Admission Orders:  Neurosurgery cons  OB/ GYN    Scheduled Meds:   allopurinol 300 mg Oral Daily   calcitriol 0 25 mcg Oral Daily   carbidopa-levodopa 1 tablet Oral TID   cyanocobalamin 1,000 mcg Oral Daily   dexamethasone 4 mg Intravenous Q6H [address]   docusate sodium 100 mg Oral BID   fluticasone 1 spray Each Nare BID   levothyroxine 75 mcg Oral Early Morning   montelukast 10 mg Oral HS   polyethylene glycol 17 g Oral Daily rOPINIRole 0 5 mg Oral TID   senna 1 tablet Oral Daily     Continuous Infusions:   sodium chloride 0 45 % with KCl 20 mEq/L 75 mL/hr Last Rate: 75 mL/hr (11/10/17 1136)     PRN Meds:   acetaminophen    HYDROmorphone    labetalol    ondansetron

## 2017-11-10 NOTE — PHYSICAL THERAPY NOTE
Physical Therapy Screen    Patient Name: Ivana Reinoso  DCNMA'P Date: 11/10/2017     PT orders received  Per  note, pt is a resident at [facility] bound and dependent at her baseline  Recommend pt return to previous facility once medically cleared with 24/7 assist from facility staff  D/C PT services     Tanika Saravia, PT

## 2017-11-10 NOTE — PROGRESS NOTES
Discussed with evening shift nurse - orders were placed for serum sodium draws at 11AM this morning and again at 3PM this morning - neither of the two labs were drawn - nurse on shift now stated she would get a STAT draw  Will have to now re-order Q4h serial sodium levels from time of new draw - will order for 9PM tonight and 1AM early morning followed by AM BMP  Discussed with nephrology over the phone - as fluids switched from NS to 0 45%NS this morning, monitor serum sodium on current regimen, and if no significant improvement over the course of the night, they are recommending switch again to D5-0 2%NS overnight with continued serum sodium monitoring

## 2017-11-10 NOTE — RESPIRATORY THERAPY NOTE
RT Protocol Note  More Rodriguez 75 y o  female MRN: [id number]  Unit/Bed#: [facility] 907-01 Encounter: [id number]    Assessment    Principal Problem:    Brain mass  Active Problems:    Chronic kidney disease, stage III (moderate)    Hypothyroidism    Symptomatic Parkinson disease (HCC)    Gout    Accelerated hypertension    Hypernatremia    Dysphagia    Acute encephalopathy    Facial droop    Class 1 obesity in adult    Diastolic heart failure (HCC)      Home Pulmonary Medications:  N/a    Past Medical History:   Diagnosis Date    Arthritis     Edema leg     Gout     Hypertension     Kidney disease     Parkinson disease ([address] )      Social History     Social History    Marital status:      Spouse name: N/A    Number of children: N/A    Years of education: N/A     Social History Main Topics    Smoking status: Unknown If Ever Smoked    Smokeless tobacco: None    Alcohol use None    Drug use: Unknown    Sexual activity: Not Asked     Other Topics Concern    None     Social History Narrative    None       Subjective         Objective    Physical Exam:   Assessment Type: Assess only  General Appearance: Awake  Respiratory Pattern: Normal  Chest Assessment: Chest expansion symmetrical  Bilateral Breath Sounds: Diminished, Clear  R Breath Sounds: Diminished  O2 Device: RA    Vitals:  Blood pressure 155/79, pulse 68, temperature 98 2 °F (36 8 °C), temperature source Oral, resp  rate 20, weight 78 3 kg (172 lb 9 9 oz), SpO2 95 %  Imaging and other studies: I have personally reviewed pertinent reports  O2 Device: RA     Plan    Respiratory Plan: No distress/Pulmonary history, Discontinue Protocol        Resp Comments: pt ordered on resp protocol, pt has no hx and takes no breathing txs at home, pt is in no resp distress and has clear bs darcie    no need for udn txs at this time pt is sating 98% on RA, will d/c protocol

## 2017-11-10 NOTE — CONSULTS
Consultation - Gynecology  Curt Villeda 75 y o  female MRN: [id number]  Unit/Bed#: [facility] 907-01 Encounter: [id number]    No chief complaint on file  History of Present Illness   Physician Requesting Consult: Mak Santiago MD  Reason for Consult / Principal Problem: enlarge uterus on imaging  Subspeciality: General GYN    HPI: Curt Villeda is a 75 y o  postmenopausal  female who presents with progressive weakness and mental status change with findings of brain lesions on imaging  On CT CAP pt found to have enlarge uterus with fluid filled cavity and fibroids  Pt is somewhat verbal, sometimes slurring speech or somnolent  She reports no pain, no vaginal bleeding or discharge  She reports she does not see a Gynecologist  Reports hx of 3 "regular" deliveries  Discussed with daughter, Sandip Au, on phone  She reports she makes decisions for her mother with her brothers  She is unsure if she would tolerate a pelvic exam given her Parkinson's and limited mobility  Inpatient consult to Obstetrics / Gynecology  Performed by: Severiano Chambers  Authorized by: Kerline Novak           Review of Systems: unable to assess    Historical Information   Past Medical History:   Diagnosis Date    Arthritis     Edema leg     Gout     Hypertension     Kidney disease     Parkinson disease ([facility] [address] )      Past Surgical History:   Procedure Laterality Date    CHOLECYSTECTOMY LAPAROSCOPIC N/A 11/30/2016    Procedure: CHOLECYSTECTOMY LAPAROSCOPIC;  Surgeon: Jaskaran Arias MD;  Location: [address] [place] [address];  Service:      OB History   No data available     History reviewed  No pertinent family history    Social History   History   Alcohol use Not on file     History   Drug use: Unknown     History   Smoking Status    Unknown If Ever Smoked   Smokeless Tobacco    Not on file       Meds/Allergies   Current Facility-Administered Medications   Medication Dose Route Frequency    acetaminophen (TYLENOL) tablet 650 mg  650 mg Oral Q6H PRN  allopurinol (ZYLOPRIM) tablet 300 mg  300 mg Oral Daily    calcitriol (ROCALTROL) capsule 0 25 mcg  0 25 mcg Oral Daily    carbidopa-levodopa (SINEMET)  mg per tablet 1 tablet  1 tablet Oral TID    cyanocobalamin (VITAMIN B-12) tablet 1,000 mcg  1,000 mcg Oral Daily    dexamethasone (DECADRON) injection 4 mg  4 mg Intravenous Q6H [address]    docusate sodium (COLACE) capsule 100 mg  100 mg Oral BID    fluticasone (FLONASE) 50 mcg/act nasal spray 1 spray  1 spray Each Nare BID    HYDROmorphone (DILAUDID) 1 mg/mL injection 0 5 mg  0 5 mg Intravenous Q1H PRN    HYDROmorphone (DILAUDID) 1 mg/mL injection 0 5 mg  0 5 mg Intravenous Q4H PRN    HYDROmorphone (DILAUDID) 1 mg/mL injection 1 mg  1 mg Intravenous Q4H PRN    labetalol (NORMODYNE) injection 10 mg  10 mg Intravenous Q4H PRN    levothyroxine tablet 75 mcg  75 mcg Oral Early Morning    montelukast (SINGULAIR) tablet 10 mg  10 mg Oral HS    ondansetron (ZOFRAN) injection 4 mg  4 mg Intravenous Q4H PRN    polyethylene glycol (MIRALAX) packet 17 g  17 g Oral Daily    rOPINIRole (REQUIP) tablet 0 5 mg  0 5 mg Oral TID    senna (SENOKOT) tablet 8 6 mg  1 tablet Oral Daily    sodium chloride 0 45 % with KCl 20 mEq/L infusion (premix)  75 mL/hr Intravenous Continuous         Allergies   Allergen Reactions    Statins        Objective   Vitals: Blood pressure 153/90, pulse 75, temperature 98 4 °F (36 9 °C), temperature source Oral, resp  rate 18, weight 78 3 kg (172 lb 9 9 oz), SpO2 95 %  Body mass index is 31 57 kg/m²  I/O this shift: In: 0   Out: 299 [Urine:299]    Invasive Devices     Peripheral Intravenous Line            Peripheral IV 11/09/17 Right Wrist 1 day                Physical Exam   HENT:   Head: Normocephalic and atraumatic  Left facial droop, drooling   Abdominal: Soft  She exhibits no distension  There is no tenderness  obese   Neurological: She is alert     Oriented to person and time, aware she was in a hospital but unsure where     Skin: Skin is warm and dry  Lab Results:   Admission on 11/09/2017   Component Date Value    Sodium 11/10/2017 151*    Potassium 11/10/2017 4 2     Chloride 11/10/2017 120*    CO2 11/10/2017 20*    Anion Gap 11/10/2017 11     BUN 11/10/2017 32*    Creatinine 11/10/2017 1 46*    Glucose 11/10/2017 128     Calcium 11/10/2017 9 5     eGFR 11/10/2017 37     WBC 11/10/2017 9 71     RBC 11/10/2017 4 45     Hemoglobin 11/10/2017 13 0     Hematocrit 11/10/2017 39 0     MCV 11/10/2017 88     MCH 11/10/2017 29 2     MCHC 11/10/2017 33 3     RDW 11/10/2017 14 7     Platelets 15/51/6910 170     MPV 11/10/2017 12 3     Magnesium 11/10/2017 2 4      Imaging Studies: REPRODUCTIVE ORGANS:  Markedly enlarged uterus, measuring 16 cm in length, due to the presence of several masses, incompletely characterized on this unenhanced CT  Most, if not all, of these masses are most likely fibroids  These include a 4 5 x 5 cm   EKG, Pathology, and Other Studies: I have personally reviewed pertinent reports  Assessment/Plan     A/P: 66yo F with brain lesions concerning for metastases and enlarged uterus     1) Enlarged uterus: in setting of brain lesions could be uterine malignancy however given patient's poor performance status, unsure if proceeding with D&C would be in the best interest of the patient especially given that she may not be a good candidate for chemotherapy if it is metastatic malignancy  If patient and family strongly desires definitive diagnosis and treatment options, we will involve Gyn Oncology  We will follow peripherally, please call if there are any concerns or if further discussion is needed          Code Status: Level 1 - Full Code  Advance Directive and Living Will: Yes    Power of :    POLST:      Silva Bales MD   OB/Gyn PGY-4  11/10/2017 12:43 PM

## 2017-11-10 NOTE — SPEECH THERAPY NOTE
Speech/Language Pathology  Assessment    Patient Name: Curt Villeda  [id number] Date: 11/10/2017     Problem List  Patient Active Problem List   Diagnosis    Pancreatitis    Benign essential hypertension    Chronic kidney disease, stage III (moderate)    Hypothyroidism    Symptomatic [facility] disease ([facility] [address] )    Gout    Brain mass    Accelerated hypertension    Hypernatremia    Dysphagia    Acute encephalopathy    Facial droop    Class 1 obesity in adult    Diastolic heart failure (HCC)     Past Medical History  Past Medical History:   Diagnosis Date    Arthritis     Edema leg     Gout     Hypertension     Kidney disease     Parkinson disease ([facility] [address] )      Past Surgical History  Past Surgical History:   Procedure Laterality Date    CHOLECYSTECTOMY LAPAROSCOPIC N/A 11/30/2016    Procedure: CHOLECYSTECTOMY LAPAROSCOPIC;  Surgeon: Jaskaran Arias MD;  Location: [address] [place] [address];  Service:      75 y o  female with history of Parkinson, hypothyroidism, dHF who originally was referred by her PCP , Dr Josue Daniel ,  to the [facility] for further evaluation  Of abnormal CT head findings of two  centrally necrotic masses within the right hemisphere with vasogenic edema  The patient was complaining on left-sided weakness ,dysphagia   and facial asymmetry so PCP proceed with CT of the head  Upon arrival to the ER she found to be dehydrated with elevated sodium, somnolent but responded to verbal stimuli  [facility] proceeded with MRI with and without contrast which reported 3 intracranial lesions varying from 13 to 35 mm with central necrosis , minor hemorrhage  and local mass effect  Finding consistent with metastatic disease ,? Melanoma  CT of abdomen showed enlarged uterus due to uterine fibroids, fluid in the uterine cavity versus endometrial thickening  ? Endometrial  Carcinoma    Given significant findings on imaging study and lack of neurosurgery service at [facility] patient was transferred to [facility]   blood pressure on admission 200/101, afebrile  Admitted on an inpatient basis to step-down 2 level  MRI-3 intracranial lesions as described varying from 13 to 35 mm in greatest linear dimension  Local mass effect, no hydrocephalus  Central necrosis and minor hemorrhage are noted  Findings consistent with metastatic disease, query melanoma  Reason for consult:  R/o aspiration  Determine safest and least restrictive diet  h/o dysphagia- within the last 2 weeks  Precautions:  -  Current diet:  npo  Premorbid diet[de-identified]  Pt had been on regular w/ thin, slp was consulted & the pt was blatantly aspirating thin but refused any diet modifications  SLP requested neuro at that time 2* the facial droop & the pts neurological decline  Pt then was unable to eat anything & requested that the nectar thick be used  Her decline was rapid from there w/ inability to eat anything or transfer  She had a diet ordered but was unable to take anything  The pt has "no tube feed" on her living will  Previous VBS:  None noted  O2 requirement:  NC  Voice/Speech:  Severe dysarthria, difficulty moving articulators, low volume  Social:  Resides at [place] commands:                        Yes for oral [facility]  Cognitive Status:  Alert, did not assess orientation  Oral [facility] exam:    Edentulous       labial weakness B/L but poor labial ROM in general marilee for retraction  reduced lingual strength/ROM - unable to protrude the tongue or lateralize      Items administered:  Puree, honey thick liquid, one ice chip  Attempted one cup sip of ht, all others were tsps  Oral stage:  Lip closure: poor w/ leak of puree, ht on the L marilee during attempts to transfer    Unable to keep the seal, material is pushed out anteriorly  Mastication: not attempted  Bolus formation: poor  Bolus control: poor  Transfer: labored, suspect some gravity trasnfer  Oral residue: throughout  Pocketing: severe anterior sulcus, b/l     Pharyngeal stage:  Swallow promptness: ?able spill, ? Laryngeal rise: mod/sev reduced  Wet voice: min w/ nt  Throat clear: w/ nt  Cough: w/ nt  Secondary swallows: attempted w/ all material    Esophageal stage:  No s/s reported    Summary:  Pt presents w/ severe to profound oral, mod/severe pharyngeal dysphagia w/ poor labial seal, poor manipulation & transfers w/ noted lingual weakness, swallow delay & poor rise  The pt is a severe risk of aspiration on all material but also severe nutritional risk  It is at least 2 weeks since she has eaten or had any good nutrition & I am also uncertain when she last had her PD meds  WIll need a clear medical picture & prognosis to make a decision on pt's status of po-& whether or not the pt is appropriate for permanent  alternate means or not  Awaiting medical decisions  Recommendations:  Keep NPO w/ frequent oral care for now  May need ng/keofeed for meds until full decision for ongoing care can be made      Results d/w:  Pt,nsg, physician  Consider consult w/:  Neurology  Goal(s):    Pt will tolerate least restrictive diet w/out s/s aspiration or oral/pharyngeal difficulties

## 2017-11-10 NOTE — H&P
History and Physical - [facility] Internal Medicine    Patient Information: Ting Oliveira 75 y o  female MRN: [id number]  Unit/Bed#: [facility] 907-01 Encounter: [id number]  Admitting Physician: Pierce Hale MD  PCP: Sudheer Ervin MD  Date of Admission:  11/09/17    Assessment/Plan:    Hospital Problem List:     Principal Problem:    Brain mass  Active Problems:    Chronic kidney disease, stage III (moderate)    Hypothyroidism    Symptomatic Parkinson disease ([facility] [place] [facility] )    Gout    Accelerated hypertension    Hypernatremia    Dysphagia    Acute encephalopathy    Facial droop    Class 1 obesity in adult    Diastolic heart failure ([facility] [place] [facility] )      Plan for the Primary Problem(s):  · Brain masses worrisome for metastatic process  · Continue Decadron IV  · Neurochecks q 1 hour  · Aspiration and fall ,seizure precautions  · NPO  Given NPO status  p o  meds converted to IV forms  · IV fluids  · Pain management  · Speech and swallow evaluation    Plan for Additional Problems:   · Parkinson  Continue Rx  · Hypothyroidism Synthroid  · Accelerated hypertension labetalol p r n  Ordered  · Diastolic heart failure will obtain echocardiogram    VTE Prophylaxis: Pharmacologic VTE Prophylaxis contraindicated due to Brain Mets with vasogenic edema and hemorrhage  / sequential compression device   Code Status: full  POLST: There is no POLST form on file for this patient (pre-hospital)    Anticipated Length of Stay:  Patient will be admitted on an Inpatient basis with an anticipated length of stay of  > 2 midnights  Justification for Hospital Stay:  Neurosurgeon evaluation    Total Time for Visit, including Counseling / Coordination of Care: 45 minutes  Greater than 50% of this total time spent on direct patient counseling and coordination of care      Chief Complaint:   Dysphagia, left-sided weakness    History of Present Illness:    Ting Oliveira is a 75 y o  female with history of Parkinson, hypothyroidism, dHF who originally was referred by her PCP , Dr Marvis Bence ,  to the [facility] for further evaluation  Of abnormal CT head findings of two  centrally necrotic masses within the right hemisphere with vasogenic edema  The patient was complaining on left-sided weakness ,dysphagia   and facial asymmetry so PCP proceed with CT of the head  Upon arrival to the ER she found to be dehydrated with elevated sodium, somnolent but responded to verbal stimuli  [place] [facility] proceeded with MRI with and without contrast which reported 3 intracranial lesions varying from 13 to 35 mm with central necrosis , minor hemorrhage  and local mass effect  Finding consistent with metastatic disease ,? Melanoma  CT of abdomen showed enlarged uterus due to uterine fibroids, fluid in the uterine cavity versus endometrial thickening  ? Endometrial  Carcinoma  Given significant findings on imaging study and lack of neurosurgery service at [place] patient was transferred to [facility]   blood pressure on admission 200/101, afebrile  Admitted on an inpatient basis to step-down 2 level  Review of Systems:    Review of Systems   Unable to perform ROS: Mental status change   Neurological: Positive for weakness  Past Medical and Surgical History:     Past Medical History:   Diagnosis Date    Arthritis     Edema leg     Gout     Hypertension     Kidney disease     Parkinson disease ([facility] [place] [facility] )        Past Surgical History:   Procedure Laterality Date    CHOLECYSTECTOMY LAPAROSCOPIC N/A 11/30/2016    Procedure: CHOLECYSTECTOMY LAPAROSCOPIC;  Surgeon: Gerald Rogers MD;  Location: [facility] MAIN OR;  Service:        Meds/Allergies:    Prior to Admission medications    Medication Sig Start Date End Date Taking?  Authorizing Provider   allopurinol (ZYLOPRIM) 300 mg tablet Take 300 mg by mouth daily    Historical Provider, MD   calcitriol (ROCALTROL) 0 25 mcg capsule Take 0 25 mcg by mouth daily    Historical Provider, MD   carbidopa-levodopa (PARCOPA)  mg per disintegrating tablet Take 1 tablet by mouth 5 (five) times a day    Historical Provider, MD   cyanocobalamin (VITAMIN B-12) 1,000 mcg tablet Take 1,000 mcg by mouth daily    Historical Provider, MD   docusate sodium (COLACE) 100 mg capsule Take 100 mg by mouth 2 (two) times a day    Historical Provider, MD   Efinaconazole (JUBLIA) 10 % SOLN Apply topically    Historical Provider, MD   fluticasone (VERAMYST) 27 5 MCG/SPRAY nasal spray 2 sprays into each nostril daily    Historical Provider, MD   furosemide (LASIX) 40 mg tablet Take 40 mg by mouth 2 (two) times a day    Historical Provider, MD   Glycopyrrolate (CUVPOSA) 1 MG/5ML SOLN Take by mouth 3 (three) times a day    Historical Provider, MD   levothyroxine 75 mcg tablet Take 75 mcg by mouth daily    Historical Provider, MD   lisinopril (ZESTRIL) 10 mg tablet Take 10 mg by mouth daily    Historical Provider, MD   montelukast (SINGULAIR) 10 mg tablet Take 10 mg by mouth daily at bedtime    Historical Provider, MD   ondansetron (ZOFRAN) 4 mg tablet Take 4 mg by mouth every 6 (six) hours as needed for nausea or vomiting    Historical Provider, MD   rOPINIRole (REQUIP) 0 5 mg tablet Take 0 5 mg by mouth 3 (three) times a day    Historical Provider, MD   traMADol (ULTRAM) 50 mg tablet Take 50 mg by mouth every 6 (six) hours as needed for moderate pain    Historical Provider, MD     I have reviewed home medications with a medical source (PCP, Pharmacy, other)  Allergies:    Allergies   Allergen Reactions    Statins        Social History:     Marital Status:    Occupation: none  Patient Pre-hospital Living Situation: NH  Patient Pre-hospital Level of Mobility:  Unknown  Patient Pre-hospital Diet Restrictions: reg  Substance Use History:   History   Alcohol use Not on file     History   Smoking Status    Unknown If Ever Smoked   Smokeless Tobacco    Not on file     History   Drug use: Unknown       Family History:    non-contributory    Physical Exam:     Vitals:   Blood Pressure: 155/79 (11/09/17 1900)  Pulse: 68 (11/09/17 1900)  Temperature: 98 2 °F (36 8 °C) (11/09/17 1900)  Temp Source: Oral (11/09/17 1900)  Respirations: 20 (11/09/17 1900)  Weight - Scale: 78 3 kg (172 lb 9 9 oz) (11/09/17 1900)  SpO2: 95 % (11/09/17 1900)    Physical Exam   Constitutional: She appears well-developed  HENT:   Head: Normocephalic  Eyes: Left eye exhibits no discharge  Neck: No thyromegaly present  Cardiovascular: Regular rhythm  Pulmonary/Chest: No respiratory distress  Abdominal: She exhibits no distension  There is no tenderness  Musculoskeletal: She exhibits no edema  Neurological: A cranial nerve deficit is present  Alert but slow with respond  Left upper extremity strength 0/5  Left corner of the mouth droop    Skin: Skin is warm  Psychiatric:   Flat affect           Additional Data:     Lab Results: I have personally reviewed pertinent reports  Results from last 7 days  Lab Units 11/09/17  1026   WBC Thousand/uL 7 10   HEMOGLOBIN g/dL 12 9   HEMATOCRIT % 40 2   PLATELETS Thousands/uL 202   NEUTROS PCT % 73   LYMPHS PCT % 10*   MONOS PCT % 6   EOS PCT % 10*       Results from last 7 days  Lab Units 11/09/17  1026   SODIUM mmol/L 151*   POTASSIUM mmol/L 3 6   CHLORIDE mmol/L 118*   CO2 mmol/L 24   BUN mg/dL 33*   CREATININE mg/dL 1 44*   CALCIUM mg/dL 9 2   TOTAL PROTEIN g/dL 6 8   BILIRUBIN TOTAL mg/dL 0 80   ALK PHOS U/L 65   ALT U/L 25   AST U/L 27   GLUCOSE RANDOM mg/dL 109       Results from last 7 days  Lab Units 11/09/17  1026   INR  1 16       Imaging: I have personally reviewed pertinent films in PACS    Ct Chest Abdomen Pelvis Wo Contrast    Result Date: 11/9/2017  Narrative: CT CHEST, ABDOMEN AND PELVIS WITHOUT IV CONTRAST INDICATION:  Intracranial masses, consistent with metastases, noted on CT and MRI from today  Evaluate for primary malignancy  COMPARISON: CTA of the abdomen from November 26, 2016  MRI of the abdomen from November 23, 2016  TECHNIQUE: CT examination of the chest, abdomen and pelvis was performed without intravenous contrast   Reformatted images were created in axial, sagittal, and coronal planes  Radiation dose length product (DLP) for this visit:  1385 64 mGy-cm   This examination, like all CT scans performed in the [facility], was performed utilizing techniques to minimize radiation dose exposure, including the use of iterative reconstruction and automated exposure control  Enteric contrast was not administered  The lack of intravenous and gastrointestinal contrast severely limits evaluation of the viscera and a significant abnormality, including primary malignancy or additional metastases, could go undetected  FINDINGS: CHEST LUNGS:  Elevation of the right hemidiaphragm with decreased volume of the right lung  Mild dependent subsegmental atelectasis in both lower lobes  Lungs otherwise clear  PLEURA:  Unremarkable  No pleural effusions  HEART/GREAT VESSELS:  Mild cardiomegaly  Aorta and central pulmonary arteries normal in caliber  MEDIASTINUM AND MINOO: No lymphadenopathy or mass  Esophagus unremarkable  Trachea and main stem bronchi normal  CHEST WALL AND LOWER NECK:  No chest wall mass or lymphadenopathy  2 4 x 3 0 cm nodule in the left thyroid lobe  Incidental discovery of one or more thyroid nodule(s) measuring more than 1 5 cm and without suspicious features is noted in this patient who is above 28years old; according to guidelines published in the February 2015 white paper on incidental thyroid nodules in the Journal of the [employer] of [employer]iology Thad Lowery), further characterization with thyroid ultrasound may be considered    However, as the majority of incidental thyroid nodules are benign and because small incidental thyroid malignancies typically demonstrate indolent behavior, consideration of the patient's life expectancy and possible comorbidities should be taken into account prior to a decision to pursue further imaging  ABDOMEN LIVER/BILIARY TREE:  Grossly normal  GALLBLADDER:  Postcholecystectomy  SPLEEN:  Unremarkable  PANCREAS:  Unremarkable  ADRENAL GLANDS:  Unremarkable  KIDNEYS/URETERS:  Collecting systems enhanced by excreted Gadavist, administered for MRI of the brain earlier today  2 9 x 4 3 cm left renal mass, incompletely characterized on this unenhanced CT, most likely a cyst   No other gross evidence of renal mass  No hydronephrosis or calculus  STOMACH AND BOWEL:  Unremarkable  APPENDIX:  No findings to suggest appendicitis  ABDOMINOPELVIC CAVITY: No lymphadenopathy or mass  No ascites  No extraluminal gas  VESSELS:  Unremarkable for patient's age  No aortic aneurysm  PELVIS REPRODUCTIVE ORGANS:  Markedly enlarged uterus, measuring 16 cm in length, due to the presence of several masses, incompletely characterized on this unenhanced CT  Most, if not all, of these masses are most likely fibroids  These include a 4 5 x 5 cm calcified right-sided fundal fibroid and an 11 x 12 cm fibroid arising from the anterior corpus and lower uterine segment  Low-attenuation in the uterine fundus is probably secretions or blood in the endometrial cavity, possibly due to occlusion of the cavity in the lower uterus segment due to extrinsic compression by the large fibroids  However, endometrial carcinoma must also be considered  The appearance of the uterus is similar to that demonstrated on an MRI of the abdomen from 11/23/2016  URINARY BLADDER:  Mass effect on the dome of the bladder due to the enlarged uterus  ABDOMINAL WALL/INGUINAL REGIONS:  Unremarkable  OSSEOUS STRUCTURES:  No acute fracture or destructive osseous lesion  Impression: 1  Examination limited by lack of intravenous and gastrointestinal contrast  2   No evidence of primary malignancy or metastases in the chest, abdomen, pelvis or included portions of the skeleton  3   Enlarged uterus due to uterine fibroids    Also, fluid in the uterine cavity versus endometrial thickening  Endometrial carcinoma should be considered  This can be evaluated more accurately with MRI of the pelvis, without and with intravenous contrast     Workstation performed: [id number]     Xr Chest 1 View Portable    Result Date: 11/9/2017  Narrative: CHEST INDICATION:  Slurred speech  COMPARISON:  Chest radiographs May 20, 2017, 2016 VIEWS:   AP frontal IMAGES:  1 FINDINGS:  Study limited due to patient positioning  A portion of the left hemithorax is not included on this examination  The heart is enlarged  Atherosclerotic changes in the aorta  Lung volumes diminished  Elevation of right hemidiaphragm  No focal pneumonia  Visualized osseous structures appear within normal limits for the patient's age  Impression: 1  Limited examination due to incomplete evaluation of the left hemithorax  2   Diminished inspiration  No active disease  Workstation performed: [id number]     Ct Head Wo Contrast    Result Date: 11/9/2017  Narrative: CT BRAIN - WITHOUT CONTRAST INDICATION:  Mental status change  COMPARISON:  12/31/2012  TECHNIQUE:  CT examination of the brain was performed  In addition to axial images, coronal reformatted images were created and submitted for interpretation  Radiation dose length product (DLP) for this visit:  1184 79 mGy-cm   This examination, like all CT scans performed in the [facility], was performed utilizing techniques to minimize radiation dose exposure, including the use of iterative reconstruction and automated exposure control  IMAGE QUALITY:  Diagnostic  FINDINGS:  PARENCHYMA:  There are 2 separate parenchymal masses identified within the right hemisphere  The larger of the 2 demonstrates central necrosis and peripheral hyperdensity centered within the right basal ganglia and centrum semiovale measuring 3 2 x 3 0 x  3 5 cm   The second smaller lesion is slightly more superior and medial within the posterior medial aspect of the right frontal lobe measuring 1 5 x 1 9 cm  There is extensive surrounding vasogenic edema extending inferiorly into the temporal lobe, through the basal ganglia and superiorly into the right frontal and parietal lobes resulting in sulcal effacement  No subfalcine or transtentorial herniation  No hemorrhage  Small focal hyperdensity identified within the posterior medial aspect of the left cerebellar hemisphere with minimal adjacent low density measuring less than 1 cm, could represent an additional lesion  No hemorrhage  Normal basilar cisterns  VENTRICLES AND EXTRA-AXIAL SPACES:  Normal for patient's age  VISUALIZED ORBITS AND PARANASAL SINUSES:  Unremarkable  CALVARIUM AND EXTRACRANIAL SOFT TISSUES:   Normal      Impression: 2 separate centrally necrotic masses identified within the right hemisphere the largest of which measures 3 5 cm  Extensive surrounding vasogenic edema  Possible subcentimeter lesion within the posterior medial left cerebellum  Differential considerations would include neoplasm such as primary glial neoplasm or metastasis  Abscess could also have this appearance  Recommend dedicated MRI of the brain with and without contrast  After discussing these findings with the referring clinician, it was decided to send the patient to the emergency department  ##cfslh I personally discussed this result with [facility] on 11/9/2017 9:27 AM  ## Workstation performed: [id number]     Mri Brain W Wo Contrast    Result Date: 11/9/2017  Narrative: MRI BRAIN WITH AND WITHOUT CONTRAST INDICATION:  Follow-up CT pathology COMPARISON:  CT 11/9/2017, remote MR 1/3/2013 TECHNIQUE: Sagittal T1, axial T2, axial FLAIR, axial T1, axial [place], axial diffusion  Sagittal, axial and coronal T1 postcontrast   Axial BRAVO post contrast   IV Contrast:  7 85 mL of Gadobutrol injection (SINGLE-DOSE)  IMAGE QUALITY:   Diagnostic   FINDINGS: BRAIN PARENCHYMA:  3 discrete intracranial lesions are identified, showing similar characteristics  These are thick, ring-enhancing lesions which are located in the lateral frontal lobe, 3 5 cm, adjacent the paramedian frontal lobe, 2 0 cm, and in the paramedian left cerebellar hemisphere, 1 3 cm  Vasogenic edema is present surrounding each lesion, increasing with lesion in size  Small amount of blood products identified in each of these foci  There is compression of adjacent sulci and CSF spaces  No hydrocephalus  No meningeal enhancement  VENTRICLES:  Normal  SELLA AND PITUITARY GLAND:  Normal  ORBITS:  Normal  PARANASAL SINUSES:  Normal  VASCULATURE:  Evaluation of the major intracranial vasculature demonstrates appropriate flow voids  CALVARIUM AND SKULL BASE:  Normal  EXTRACRANIAL SOFT TISSUES:  Normal      Impression: 3 intracranial lesions as described varying from 13 to 35 mm in greatest linear dimension  Local mass effect, no hydrocephalus  Central necrosis and minor hemorrhage are noted  Findings consistent with metastatic disease, query melanoma  Workstation performed: [id number]       EKG, Pathology, and Other Studies Reviewed on Admission:   · EKG: sinus    Allscripts / Epic Records Reviewed: Yes     ** Please Note: This note has been constructed using a voice recognition system   **

## 2017-11-10 NOTE — CONSULTS
Consultation - Neurosurgery   Lamont Ly 75 y o  female MRN: [id number]  Unit/Bed#: [facility] 907-01 Encounter: [id number]      Assessment/Plan     Assessment:  1  Three ring-enhancing necrotic intracranial lesions- 3 5 cm in lateral frontal lobe, 2 0 cm paramedian frontal lobe, 1 3 cm left cerebellar hemisphere  2  Vasogenic edema and brain compression secondary to 1  3  Parkinson's disease  4  Encephalopathy  5  Severe protein calorie malnutrition secondary to dysphagia  NG tube has been inserted  6  Chronic kidney disease    Plan:  · Neuro exam:  GCS 13 E3, V4, M6  Diffuse left-sided weakness, oriented times self, place, and year otherwise exhibits confusion with recent memory  · Imaging reviewed personally with attending  · CT head 11/9:2 centrally necrotic masses in the right hemisphere  · MRI brain 11/9:3 intracranial lesions  The, ring-enhancing  Located in lateral frontal lobe 3 5 cm, adjacent paramedian frontal lobe 2 0 cm, and left cerebellar hemisphere 1 3 cm associated vasogenic edema mass effect  · CT chest abdomen pelvis completed without contrast:  Enlarged uterus secondary to fibroids  Fluid in the uterine cavity versus endometrial thickening  Endometrial carcinoma strongly considered  · Continue medical management by primary team, internal Medicine  · Gynecology consulted:  Unclear whether we will proceed with D&C due to poor functional level  Question candidacy for chemo/RT  · Recommend gynecologic oncology consult  · Recommend palliative care consult  · NG tube inserted and patient follow up closely followed by speech therapy due to dysphagia and severe protein calorie malnutrition  · Continue medical management of Parkinson's disease, Sinnemet to be administered through NG tube, CKD, hypertension, gout  · No contraindication for DVT prophylaxis from a neurosurgical standpoint     · Continue Decadron 4 mg q 6 hours for management of vasogenic edema  · Initiate Keppra 500 mg q 12 hours for seizure prophylaxis  · Continue neuro checks, call with questions, concerns, declining mental status  · Discussed with daughter, Vivienne who states that she is mentally challenged and feel it is better to call her brother for guidance  Have unable to reach her son, Melchor العلي at [phone]  Left message on machine  Please try to call again tomorrow  · Neurosurgery will continue to follow  No plan for surgical intervention at this juncture secondary to poor functional status  Continue medical optimization  Strongly recommend gynecologic Oncology and palliative care consultation in addition to goals of care discussion with family  History of Present Illness     History, ROS and PFSH limited from patient due to for participation and confusion  Spoke with daughter Vivienne was also limited in providing history  Called patient's son Melchor العلي and provided call back number without return of call  HPI: Hollie Keller is a 75y o  year old female , past medical history of Parkinson's disease, CKD, hypertension, gout, arthritis, hypothyroidism, who presents transfer from [address] [place] [address] for new diagnosed brain mass  Identified on outpatient CT  Patient previously resided at [facility]  Per review of previous records is reported that patient has had slow progression of left-sided weakness, worsening dysphagia over the past 2 weeks  Per discussion with speech therapy and facility patient had minimal oral intake over the past 2 weeks  Unclear when patient had any fluid or solid intake  Subsequently patient has been unable to receive her Parkinson's meds at least 2 weeks  Outpatient CT head was ordered revealing multiple right-sided brain masses  Was referred to emergency room for further workup  There an MRI of the brain and CT of the chest abdomen pelvis were completed    Upon identification of brain masses and suspected endometrial/uterine cancer patient was transferred for higher level of care  Prior to decline it is unclear patient's functional status  It is known that she is in a wheelchair at baseline and is reported to have dementia, unspecified severity  Upon arrival to the emergency room patient was somnolent but responded to verbal stimuli  She was noted to be confused that time, globally weak the left greater than right  Upon transfer and personal evaluation limited review of systems and history is able to be obtained from the patient  She admits to moderate global headache, nausea, difficulty breathing, generalized abdominal pain, pain in upper and lower extremities, fatigue, difficulty swallowing  Inpatient consult to Neurosurgery  Consult performed by: Nati Cowart  Consult ordered by: Bruno Leon          Review of Systems   Constitutional: Positive for activity change, appetite change and fatigue  HENT: Positive for hearing loss and trouble swallowing  Eyes: Negative for visual disturbance  Respiratory: Positive for choking ( sensation of choking since NG tube inserted ), chest tightness and shortness of breath  Cardiovascular: Negative for chest pain  Gastrointestinal: Positive for abdominal pain and nausea  Musculoskeletal: Positive for arthralgias and myalgias  Allergic/Immunologic:        Allergy to statins   Neurological: Positive for dizziness, tremors, seizures, speech difficulty, light-headedness and headaches  Psychiatric/Behavioral: Positive for agitation and confusion         Historical Information   Past Medical History:   Diagnosis Date    Arthritis     Edema leg     Gout     Hypertension     Kidney disease     Parkinson disease ([facility] [address] )      Past Surgical History:   Procedure Laterality Date    CHOLECYSTECTOMY LAPAROSCOPIC N/A 11/30/2016    Procedure: CHOLECYSTECTOMY LAPAROSCOPIC;  Surgeon: Rai Donohue MD;  Location: [address] [place] [address];  Service:      History   Alcohol use Not on file     History   Drug use: Unknown History   Smoking Status    Unknown If Ever Smoked   Smokeless Tobacco    Not on file     History reviewed  No pertinent family history  Meds/Allergies   all current active meds have been reviewed, current meds:   Current Facility-Administered Medications   Medication Dose Route Frequency    acetaminophen (TYLENOL) tablet 650 mg  650 mg Oral Q6H PRN    allopurinol (ZYLOPRIM) tablet 300 mg  300 mg Oral Daily    calcitriol (ROCALTROL) capsule 0 25 mcg  0 25 mcg Oral Daily    carbidopa-levodopa (SINEMET)  mg per tablet 1 tablet  1 tablet Oral TID    cyanocobalamin (VITAMIN B-12) tablet 1,000 mcg  1,000 mcg Oral Daily    dexamethasone (DECADRON) injection 4 mg  4 mg Intravenous Q6H [address]    docusate sodium (COLACE) capsule 100 mg  100 mg Oral BID    fluticasone (FLONASE) 50 mcg/act nasal spray 1 spray  1 spray Each Nare BID    HYDROmorphone (DILAUDID) 1 mg/mL injection 0 5 mg  0 5 mg Intravenous Q1H PRN    HYDROmorphone (DILAUDID) 1 mg/mL injection 0 5 mg  0 5 mg Intravenous Q4H PRN    HYDROmorphone (DILAUDID) 1 mg/mL injection 1 mg  1 mg Intravenous Q4H PRN    labetalol (NORMODYNE) injection 10 mg  10 mg Intravenous Q4H PRN    levothyroxine tablet 75 mcg  75 mcg Oral Early Morning    montelukast (SINGULAIR) tablet 10 mg  10 mg Oral HS    ondansetron (ZOFRAN) injection 4 mg  4 mg Intravenous Q4H PRN    polyethylene glycol (MIRALAX) packet 17 g  17 g Oral Daily    rOPINIRole (REQUIP) tablet 0 5 mg  0 5 mg Oral TID    senna (SENOKOT) tablet 8 6 mg  1 tablet Oral Daily    sodium chloride 0 45 % with KCl 20 mEq/L infusion (premix)  75 mL/hr Intravenous Continuous    and PTA meds:   Prior to Admission Medications   Prescriptions Last Dose Informant Patient Reported? Taking?    Efinaconazole (JUBLIA) 10 % SOLN   Yes No   Sig: Apply topically   Glycopyrrolate (CUVPOSA) 1 MG/5ML SOLN   Yes No   Sig: Take by mouth 3 (three) times a day   allopurinol (ZYLOPRIM) 300 mg tablet   Yes No   Sig: Take 300 mg by mouth daily   calcitriol (ROCALTROL) 0 25 mcg capsule   Yes No   Sig: Take 0 25 mcg by mouth daily   carbidopa-levodopa (PARCOPA)  mg per disintegrating tablet   Yes No   Sig: Take 1 tablet by mouth 5 (five) times a day   cyanocobalamin (VITAMIN B-12) 1,000 mcg tablet   Yes No   Sig: Take 1,000 mcg by mouth daily   docusate sodium (COLACE) 100 mg capsule   Yes No   Sig: Take 100 mg by mouth 2 (two) times a day   fluticasone (VERAMYST) 27 5 MCG/SPRAY nasal spray   Yes No   Si sprays into each nostril daily   furosemide (LASIX) 40 mg tablet   Yes No   Sig: Take 40 mg by mouth 2 (two) times a day   levothyroxine 75 mcg tablet   Yes No   Sig: Take 75 mcg by mouth daily   lisinopril (ZESTRIL) 10 mg tablet   Yes No   Sig: Take 10 mg by mouth daily   montelukast (SINGULAIR) 10 mg tablet   Yes No   Sig: Take 10 mg by mouth daily at bedtime   ondansetron (ZOFRAN) 4 mg tablet   Yes No   Sig: Take 4 mg by mouth every 6 (six) hours as needed for nausea or vomiting   rOPINIRole (REQUIP) 0 5 mg tablet   Yes No   Sig: Take 0 5 mg by mouth 3 (three) times a day   traMADol (ULTRAM) 50 mg tablet   Yes No   Sig: Take 50 mg by mouth every 6 (six) hours as needed for moderate pain      Facility-Administered Medications: None     Allergies   Allergen Reactions    Statins        Objective     Intake/Output Summary (Last 24 hours) at 11/10/17 1545  Last data filed at 11/10/17 1322   Gross per 24 hour   Intake              690 ml   Output              299 ml   Net              391 ml       Physical Exam   Constitutional: She appears well-developed and well-nourished  She appears lethargic  HENT:   Head: Normocephalic and atraumatic  Eyes: Conjunctivae and EOM are normal  Pupils are equal, round, and reactive to light  Neck:   Patient with flex neck at rest   Patient does not participate when asked to extend  Does not grimace with posterior palpation  Cardiovascular: Normal rate      Pulmonary/Chest: Effort normal  Abdominal: There is tenderness (generalized)  There is no rebound and no guarding  Neurological: She appears lethargic  Finger-nose-finger test: Unable to participate on left  Dysmetria on right  GCS eye subscore is 3  GCS verbal subscore is 4  GCS motor subscore is 6  Reflex Scores:       Brachioradialis reflexes are 1+ on the right side and 1+ on the left side  Patellar reflexes are 1+ on the right side and 1+ on the left side  Psychiatric: Her speech is slurred  She is agitated, slowed and withdrawn  Patient is agitated by review of systems and exam   States stop bothering me  She is inattentive  Neurologic Exam     Mental Status   Oriented to person  (Oriented to hospital but not name or city )  Oriented to time  Oriented to year and month  Follows 1 step commands  Attention: decreased  Concentration: decreased  Speech: slurred   Level of consciousness: drowsy  Unable to perform simple calculations  Able to name object  Able to repeat  Cranial Nerves     CN II   Visual acuity: normal    CN III, IV, VI   Pupils are equal, round, and reactive to light  Extraocular motions are normal      CN V   Right facial sensation deficit: Patient does not participate with facial sensation  Testing  CN VII   Right facial weakness: No facial asymmetry at rest   However patient does not participate with exam     CN VIII   Hearing: impaired    Motor Exam RUE:  4/5  otherwise 3+/5 throughout  LUE:  No movement in hand or proximal arm  LLE:  1/5 movement in quads, anterior tibial, wiggles toes  RLE:  3+/5 throughout  Sensory Exam   Unable to participate in full sensory testing, but appears to be intact in bilateral upper and lower extremities  Unable to put to be in JPS exam     Gait, Coordination, and Reflexes     Coordination   Finger-nose-finger test: Unable to participate on left  Dysmetria on right      Tremor   Resting tremor: present (Bilateral upper extremities)    Reflexes Right brachioradialis: 1+  Left brachioradialis: 1+  Right patellar: 1+  Left patellar: 1+  Right ankle clonus: absent  Left ankle clonus: absent      Vitals:Blood pressure 153/90, pulse 75, temperature 98 4 °F (36 9 °C), temperature source Oral, resp  rate 18, weight 78 3 kg (172 lb 9 9 oz), SpO2 95 %  ,Body mass index is 31 57 kg/m²  Lab Results:   I have personally reviewed pertinent results  Lab Results   Component Value Date    WBC 9 71 11/10/2017    HGB 13 0 11/10/2017    HCT 39 0 11/10/2017    MCV 88 11/10/2017     11/10/2017    MCH 29 2 11/10/2017    MCHC 33 3 11/10/2017    RDW 14 7 11/10/2017    MPV 12 3 11/10/2017     (H) 11/10/2017     (H) 11/10/2017    CO2 20 (L) 11/10/2017    ANIONGAP 11 11/10/2017    BUN 32 (H) 11/10/2017    CREATININE 1 46 (H) 11/10/2017    GLUCOSE 128 11/10/2017    CALCIUM 9 5 11/10/2017    EGFR 37 11/10/2017       Imaging Studies: I have personally reviewed pertinent reports  and I have personally reviewed pertinent films in PACS      Ct Chest Abdomen Pelvis Wo Contrast    Result Date: 11/9/2017  Narrative: CT CHEST, ABDOMEN AND PELVIS WITHOUT IV CONTRAST INDICATION:  Intracranial masses, consistent with metastases, noted on CT and MRI from today  Evaluate for primary malignancy  COMPARISON: CTA of the abdomen from November 26, 2016  MRI of the abdomen from November 23, 2016  TECHNIQUE: CT examination of the chest, abdomen and pelvis was performed without intravenous contrast   Reformatted images were created in axial, sagittal, and coronal planes  Radiation dose length product (DLP) for this visit:  1385 64 mGy-cm   This examination, like all CT scans performed in the [facility], was performed utilizing techniques to minimize radiation dose exposure, including the use of iterative reconstruction and automated exposure control  Enteric contrast was not administered    The lack of intravenous and gastrointestinal contrast severely limits evaluation of the viscera and a significant abnormality, including primary malignancy or additional metastases, could go undetected  FINDINGS: CHEST LUNGS:  Elevation of the right hemidiaphragm with decreased volume of the right lung  Mild dependent subsegmental atelectasis in both lower lobes  Lungs otherwise clear  PLEURA:  Unremarkable  No pleural effusions  HEART/GREAT VESSELS:  Mild cardiomegaly  Aorta and central pulmonary arteries normal in caliber  MEDIASTINUM AND MINOO: No lymphadenopathy or mass  Esophagus unremarkable  Trachea and main stem bronchi normal  CHEST WALL AND LOWER NECK:  No chest wall mass or lymphadenopathy  2 4 x 3 0 cm nodule in the left thyroid lobe  Incidental discovery of one or more thyroid nodule(s) measuring more than 1 5 cm and without suspicious features is noted in this patient who is above 28years old; according to guidelines published in the February 2015 white paper on incidental thyroid nodules in the Journal of the [employer] of Radiology Andrey Smoker), further characterization with thyroid ultrasound may be considered  However, as the majority of incidental thyroid nodules are benign and because small incidental thyroid malignancies typically demonstrate indolent behavior, consideration of the patient's life expectancy and possible comorbidities should be taken into account prior to a decision to pursue further imaging  ABDOMEN LIVER/BILIARY TREE:  Grossly normal  GALLBLADDER:  Postcholecystectomy  SPLEEN:  Unremarkable  PANCREAS:  Unremarkable  ADRENAL GLANDS:  Unremarkable  KIDNEYS/URETERS:  Collecting systems enhanced by excreted Gadavist, administered for MRI of the brain earlier today  2 9 x 4 3 cm left renal mass, incompletely characterized on this unenhanced CT, most likely a cyst   No other gross evidence of renal mass  No hydronephrosis or calculus  STOMACH AND BOWEL:  Unremarkable  APPENDIX:  No findings to suggest appendicitis   ABDOMINOPELVIC CAVITY: No lymphadenopathy or mass  No ascites  No extraluminal gas  VESSELS:  Unremarkable for patient's age  No aortic aneurysm  PELVIS REPRODUCTIVE ORGANS:  Markedly enlarged uterus, measuring 16 cm in length, due to the presence of several masses, incompletely characterized on this unenhanced CT  Most, if not all, of these masses are most likely fibroids  These include a 4 5 x 5 cm calcified right-sided fundal fibroid and an 11 x 12 cm fibroid arising from the anterior corpus and lower uterine segment  Low-attenuation in the uterine fundus is probably secretions or blood in the endometrial cavity, possibly due to occlusion of the cavity in the lower uterus segment due to extrinsic compression by the large fibroids  However, endometrial carcinoma must also be considered  The appearance of the uterus is similar to that demonstrated on an MRI of the abdomen from 11/23/2016  URINARY BLADDER:  Mass effect on the dome of the bladder due to the enlarged uterus  ABDOMINAL WALL/INGUINAL REGIONS:  Unremarkable  OSSEOUS STRUCTURES:  No acute fracture or destructive osseous lesion  Impression: 1  Examination limited by lack of intravenous and gastrointestinal contrast  2   No evidence of primary malignancy or metastases in the chest, abdomen, pelvis or included portions of the skeleton  3   Enlarged uterus due to uterine fibroids  Also, fluid in the uterine cavity versus endometrial thickening  Endometrial carcinoma should be considered  This can be evaluated more accurately with MRI of the pelvis, without and with intravenous contrast     Workstation performed: [id number]     Xr Chest 1 View Portable    Result Date: 11/9/2017  Narrative: CHEST INDICATION:  Slurred speech  COMPARISON:  Chest radiographs May 20, 2017, 2016 VIEWS:   AP frontal IMAGES:  1 FINDINGS:  Study limited due to patient positioning  A portion of the left hemithorax is not included on this examination  The heart is enlarged  Atherosclerotic changes in the aorta  Lung volumes diminished  Elevation of right hemidiaphragm  No focal pneumonia  Visualized osseous structures appear within normal limits for the patient's age  Impression: 1  Limited examination due to incomplete evaluation of the left hemithorax  2   Diminished inspiration  No active disease  Workstation performed: [id number]     Ct Head Wo Contrast    Result Date: 11/9/2017  Narrative: CT BRAIN - WITHOUT CONTRAST INDICATION:  Mental status change  COMPARISON:  12/31/2012  TECHNIQUE:  CT examination of the brain was performed  In addition to axial images, coronal reformatted images were created and submitted for interpretation  Radiation dose length product (DLP) for this visit:  1184 79 mGy-cm   This examination, like all CT scans performed in the [facility], was performed utilizing techniques to minimize radiation dose exposure, including the use of iterative reconstruction and automated exposure control  IMAGE QUALITY:  Diagnostic  FINDINGS:  PARENCHYMA:  There are 2 separate parenchymal masses identified within the right hemisphere  The larger of the 2 demonstrates central necrosis and peripheral hyperdensity centered within the right basal ganglia and centrum semiovale measuring 3 2 x 3 0 x  3 5 cm  The second smaller lesion is slightly more superior and medial within the posterior medial aspect of the right frontal lobe measuring 1 5 x 1 9 cm  There is extensive surrounding vasogenic edema extending inferiorly into the temporal lobe, through the basal ganglia and superiorly into the right frontal and parietal lobes resulting in sulcal effacement  No subfalcine or transtentorial herniation  No hemorrhage  Small focal hyperdensity identified within the posterior medial aspect of the left cerebellar hemisphere with minimal adjacent low density measuring less than 1 cm, could represent an additional lesion  No hemorrhage   Normal basilar cisterns  VENTRICLES AND EXTRA-AXIAL SPACES:  Normal for patient's age  VISUALIZED ORBITS AND PARANASAL SINUSES:  Unremarkable  CALVARIUM AND EXTRACRANIAL SOFT TISSUES:   Normal      Impression: 2 separate centrally necrotic masses identified within the right hemisphere the largest of which measures 3 5 cm  Extensive surrounding vasogenic edema  Possible subcentimeter lesion within the posterior medial left cerebellum  Differential considerations would include neoplasm such as primary glial neoplasm or metastasis  Abscess could also have this appearance  Recommend dedicated MRI of the brain with and without contrast  After discussing these findings with the referring clinician, it was decided to send the patient to the emergency department  ##cfslh I personally discussed this result with [facility] on 11/9/2017 9:27 AM  ## Workstation performed: [id number]     Mri Brain W Wo Contrast    Result Date: 11/9/2017  Narrative: MRI BRAIN WITH AND WITHOUT CONTRAST INDICATION:  Follow-up CT pathology COMPARISON:  CT 11/9/2017, remote MR 1/3/2013 TECHNIQUE: Sagittal T1, axial T2, axial FLAIR, axial T1, axial [place], axial diffusion  Sagittal, axial and coronal T1 postcontrast   Axial BRAVO post contrast   IV Contrast:  7 85 mL of Gadobutrol injection (SINGLE-DOSE)  IMAGE QUALITY:   Diagnostic  FINDINGS: BRAIN PARENCHYMA:  3 discrete intracranial lesions are identified, showing similar characteristics  These are thick, ring-enhancing lesions which are located in the lateral frontal lobe, 3 5 cm, adjacent the paramedian frontal lobe, 2 0 cm, and in the paramedian left cerebellar hemisphere, 1 3 cm  Vasogenic edema is present surrounding each lesion, increasing with lesion in size  Small amount of blood products identified in each of these foci  There is compression of adjacent sulci and CSF spaces  No hydrocephalus  No meningeal enhancement       VENTRICLES:  Normal  SELLA AND PITUITARY GLAND:  Normal  ORBITS: Normal  PARANASAL SINUSES:  Normal  VASCULATURE:  Evaluation of the major intracranial vasculature demonstrates appropriate flow voids  CALVARIUM AND SKULL BASE:  Normal  EXTRACRANIAL SOFT TISSUES:  Normal      Impression: 3 intracranial lesions as described varying from 13 to 35 mm in greatest linear dimension  Local mass effect, no hydrocephalus  Central necrosis and minor hemorrhage are noted  Findings consistent with metastatic disease, query melanoma  Workstation performed: [id number]     EKG, Pathology, and Other Studies: I have personally reviewed pertinent reports        VTE Prophylaxis: Sequential compression device Kailey Bi)     Code Status: Level 1 - Full Code  Advance Directive and Living Will: Yes    Power of :    POLST:

## 2017-11-10 NOTE — PLAN OF CARE
DISCHARGE PLANNING     Discharge to home or other facility with appropriate resources Progressing        Knowledge Deficit     Patient/family/caregiver demonstrates understanding of disease process, treatment plan, medications, and discharge instructions Progressing        PAIN - ADULT     Verbalizes/displays adequate comfort level or baseline comfort level Progressing        Prexisting or High Potential for Compromised Skin Integrity     Skin integrity is maintained or improved Progressing        SAFETY ADULT     Patient will remain free of falls Progressing     Maintain or return to baseline ADL function Progressing     Maintain or return mobility status to optimal level Progressing

## 2017-11-10 NOTE — OCCUPATIONAL THERAPY NOTE
OCCUPATIONAL THERAPY SCREEN:    ORDERS RECEIVED  CHART REVIEW COMPLETED  PT IS A RESIDENT AT SNF WHERE SHE REQUIRES ASSISTANCE FOR ADLS AND IS PRIMARILY W/C BOUND  PT CAN RETURN TO SNF WITH APPROPRIATE LEVEL OF CARE  NO ADDITIONAL ACUTE CARE OT NEEDS  D/C OT       Leobardo David, MOT, OTR/L

## 2017-11-10 NOTE — PLAN OF CARE

## 2017-11-10 NOTE — PLAN OF CARE
DISCHARGE PLANNING     Discharge to home or other facility with appropriate resources Progressing        Knowledge Deficit     Patient/family/caregiver demonstrates understanding of disease process, treatment plan, medications, and discharge instructions Progressing        PAIN - ADULT     Verbalizes/displays adequate comfort level or baseline comfort level Progressing        Potential for Falls     Patient will remain free of falls Progressing        Prexisting or High Potential for Compromised Skin Integrity     Skin integrity is maintained or improved Progressing        SAFETY ADULT     Patient will remain free of falls Progressing     Maintain or return to baseline ADL function Progressing     Maintain or return mobility status to optimal level Progressing

## 2017-11-10 NOTE — SOCIAL WORK
Cm reviewed patient during care coordination rounds  Patient is not medically stable for d/c today  Patient is a resident at [facility]  Cm spoke with RN Gregory Perez at facility who stated the patient is WC bound as of recently at her baseline  Per RN, patient is a total dependent for dressing, bathing and feedings  Patient is usually alert while at the facility  Cm attempted to contact patient's daughter to discuss role of CM and future discharge plan  Patient's daughter in agreement with a referral back to [facility]  Cm placed referral requesting bed status at facility

## 2017-11-11 PROBLEM — I47.2 VENTRICULAR TACHYARRHYTHMIA (HCC): Status: ACTIVE | Noted: 2017-11-11

## 2017-11-11 LAB
ANION GAP SERPL CALCULATED.3IONS-SCNC: 8 MMOL/L (ref 4–13)
ATRIAL RATE: 50 BPM
ATRIAL RATE: 51 BPM
ATRIAL RATE: 62 BPM
BUN SERPL-MCNC: 39 MG/DL (ref 5–25)
CALCIUM SERPL-MCNC: 9.3 MG/DL (ref 8.3–10.1)
CHLORIDE SERPL-SCNC: 122 MMOL/L (ref 100–108)
CO2 SERPL-SCNC: 23 MMOL/L (ref 21–32)
CREAT SERPL-MCNC: 1.44 MG/DL (ref 0.6–1.3)
GFR SERPL CREATININE-BSD FRML MDRD: 37 ML/MIN/1.73SQ M
GLUCOSE SERPL-MCNC: 159 MG/DL (ref 65–140)
MAGNESIUM SERPL-MCNC: 2.4 MG/DL (ref 1.6–2.6)
P AXIS: 58 DEGREES
P AXIS: 78 DEGREES
POTASSIUM SERPL-SCNC: 4.5 MMOL/L (ref 3.5–5.3)
PR INTERVAL: 162 MS
PR INTERVAL: 164 MS
QRS AXIS: -17 DEGREES
QRS AXIS: -18 DEGREES
QRS AXIS: -20 DEGREES
QRSD INTERVAL: 74 MS
QT INTERVAL: 476 MS
QT INTERVAL: 488 MS
QT INTERVAL: 490 MS
QTC INTERVAL: 433 MS
QTC INTERVAL: 451 MS
QTC INTERVAL: 495 MS
SODIUM SERPL-SCNC: 149 MMOL/L (ref 136–145)
SODIUM SERPL-SCNC: 150 MMOL/L (ref 136–145)
SODIUM SERPL-SCNC: 150 MMOL/L (ref 136–145)
SODIUM SERPL-SCNC: 153 MMOL/L (ref 136–145)
T WAVE AXIS: -17 DEGREES
T WAVE AXIS: -20 DEGREES
T WAVE AXIS: -22 DEGREES
TROPONIN I SERPL-MCNC: <0.02 NG/ML
TROPONIN I SERPL-MCNC: <0.02 NG/ML
VENTRICULAR RATE: 50 BPM
VENTRICULAR RATE: 51 BPM
VENTRICULAR RATE: 62 BPM

## 2017-11-11 PROCEDURE — 84484 ASSAY OF TROPONIN QUANT: CPT | Performed by: PHYSICIAN ASSISTANT

## 2017-11-11 PROCEDURE — 80048 BASIC METABOLIC PNL TOTAL CA: CPT | Performed by: FAMILY MEDICINE

## 2017-11-11 PROCEDURE — 93005 ELECTROCARDIOGRAM TRACING: CPT | Performed by: FAMILY MEDICINE

## 2017-11-11 PROCEDURE — 93005 ELECTROCARDIOGRAM TRACING: CPT

## 2017-11-11 PROCEDURE — 84295 ASSAY OF SERUM SODIUM: CPT | Performed by: INTERNAL MEDICINE

## 2017-11-11 PROCEDURE — 83735 ASSAY OF MAGNESIUM: CPT | Performed by: PHYSICIAN ASSISTANT

## 2017-11-11 PROCEDURE — 93005 ELECTROCARDIOGRAM TRACING: CPT | Performed by: PHYSICIAN ASSISTANT

## 2017-11-11 PROCEDURE — 84295 ASSAY OF SERUM SODIUM: CPT | Performed by: PHYSICIAN ASSISTANT

## 2017-11-11 RX ORDER — DEXTROSE AND SODIUM CHLORIDE 5; .2 G/100ML; G/100ML
75 INJECTION, SOLUTION INTRAVENOUS CONTINUOUS
Status: DISCONTINUED | OUTPATIENT
Start: 2017-11-11 | End: 2017-11-13 | Stop reason: HOSPADM

## 2017-11-11 RX ADMIN — LEVETIRACETAM 500 MG: 100 INJECTION, SOLUTION INTRAVENOUS at 08:16

## 2017-11-11 RX ADMIN — CARBIDOPA AND LEVODOPA 1 TABLET: 25; 100 TABLET ORAL at 17:33

## 2017-11-11 RX ADMIN — CARBIDOPA AND LEVODOPA 1 TABLET: 25; 100 TABLET ORAL at 21:18

## 2017-11-11 RX ADMIN — LEVETIRACETAM 500 MG: 100 INJECTION, SOLUTION INTRAVENOUS at 21:19

## 2017-11-11 RX ADMIN — CARBIDOPA AND LEVODOPA 1 TABLET: 25; 100 TABLET ORAL at 08:10

## 2017-11-11 RX ADMIN — ALLOPURINOL 300 MG: 300 TABLET ORAL at 08:06

## 2017-11-11 RX ADMIN — ROPINIROLE 0.5 MG: 1 TABLET, FILM COATED ORAL at 21:16

## 2017-11-11 RX ADMIN — LEVOTHYROXINE SODIUM 75 MCG: 75 TABLET ORAL at 05:40

## 2017-11-11 RX ADMIN — CYANOCOBALAMIN TAB 500 MCG 1000 MCG: 500 TAB at 08:10

## 2017-11-11 RX ADMIN — DEXAMETHASONE SODIUM PHOSPHATE 4 MG: 4 INJECTION, SOLUTION INTRAMUSCULAR; INTRAVENOUS at 17:34

## 2017-11-11 RX ADMIN — DEXTROSE AND SODIUM CHLORIDE 75 ML/HR: 5; .2 INJECTION, SOLUTION INTRAVENOUS at 04:41

## 2017-11-11 RX ADMIN — ROPINIROLE 0.5 MG: 1 TABLET, FILM COATED ORAL at 08:06

## 2017-11-11 RX ADMIN — DOCUSATE SODIUM 100 MG: 100 CAPSULE, LIQUID FILLED ORAL at 17:34

## 2017-11-11 RX ADMIN — MONTELUKAST SODIUM 10 MG: 10 TABLET, FILM COATED ORAL at 21:18

## 2017-11-11 RX ADMIN — DEXAMETHASONE SODIUM PHOSPHATE 4 MG: 4 INJECTION, SOLUTION INTRAMUSCULAR; INTRAVENOUS at 05:04

## 2017-11-11 RX ADMIN — DEXAMETHASONE SODIUM PHOSPHATE 4 MG: 4 INJECTION, SOLUTION INTRAMUSCULAR; INTRAVENOUS at 00:17

## 2017-11-11 RX ADMIN — DEXTROSE AND SODIUM CHLORIDE 75 ML/HR: 5; .2 INJECTION, SOLUTION INTRAVENOUS at 17:52

## 2017-11-11 RX ADMIN — DEXAMETHASONE SODIUM PHOSPHATE 4 MG: 4 INJECTION, SOLUTION INTRAMUSCULAR; INTRAVENOUS at 11:46

## 2017-11-11 RX ADMIN — ROPINIROLE 0.5 MG: 1 TABLET, FILM COATED ORAL at 17:33

## 2017-11-11 NOTE — PROGRESS NOTES
Progress Note - Neurosurgery   Inocente Conte 75 y o  female MRN: [id number]  Unit/Bed#: [facility] 907-01 Encounter: [id number]    Assessment:  1  Three ring- enhancing narcotic intracranial lesion -- 3 5 cm and lateral frontal lobe, 2 0 cm paramedian frontal lobe, 1 3 cm left cerebellar hemisphere  2  Vasogenic edema and brain compression secondary to #1  3  Hypernatremia  4  Parkinson's disease  5  Encephalopathy  6  Severe protein calorie malnutrition secondary to dysphagia  She has an NG tube  7  Chronic kidney disease  8  Report of ventricular tachycardia earlier this AM  9  Possible depression  Patient reports "I want to die"  10  Abdominal pain  11  Sore throat    Plan:  · Exam:  GCS 14  E3, V5, M6  Oriented to self, place [facility]"), month/year  Limited exam with patient unwilling to participate at times with exam  ("Leave me alone")  Diffuse left sided weakness  · CT head 11/9 -- 2 centrally necrotic masses in the right hemisphere  · MRI Brain 11/9 --  3 ring- enhancing narcotic intracranial lesion -- 3 5 cm and lateral frontal lobe, 2 0 cm paramedian frontal lobe, 1 3 cm left cerebellar hemisphere  · CT C/A/P without contrast 11/9 -- enlarged uterus secondary to fibroids  Fluid in the uterine cavity versus endometrial thickening  Endometrial carcinoma strongly considered  · Troponin (11/11/17) -- negative x 2  · Continue medical management by primary team ([facility])  · I spoke with [facility] attending [facility] service) who will evaluate patient for medical management including consider  Psychiatry evaluation and pending outcome of Palliative care consult reconsider Cardiology evaluation  · Gynecology consulted:  Unclear whether to proceed with D and C due to poor functional level  Question candidacy for chemo/RT  · Recommended gynecologic Oncology consult although service does not appear to have been consulted at this juncture  · Palliative care consult pending    · NG tube present and patient to be followed up closely by Speech therapy for dysphagia and severe protein calorie malnutrition  · Continue medical management for Parkinson's disease, Sinemet to be administered through NG tube,  CKD, HTN, Gout  · No contraindication for DVT ppx from a neurosurgical standpoint  · Continue decadron 4mg q 6 hrs for management of vasogenic edema  · Suggest primary service consider GI prophylaxis while on Decadron  · Continue neuro checks  · Our service previously discussed with daughter, Be Marquez who reported she is mentally challenged and felt it is better to call her brother for guidance  Have been unable to reach patient's son, Imani Long at [phone]  I called Francisco Javier this morning (p#[phone]) and left message on machine requesting call back  · Neurosurgery following  I discussed case with Dr Александр Mayen  No plan for surgical intervention at this juncture secondary to poor functional status  Continue medical optimization recommended  Recommend GYN Oncology and Palliative care consultation in addition to goals of care discussion with family  Addendum 10:31 am (11/11/17) -- received phone call back from Imani Long (son) / Xuan Cook (patient's daughter-in-law)  I updated [address] whom Imani Long asked me to speak with too  I explained patient is poor candidate for neurosurgical intervention in setting of poor functional status / multiple comorbidities  Xuan Cook reports she doubts family would ever consider any neurosurgical intervention  Imani Long / Xuan Cook reports they/ family will be coming to hospital tomorrow and would like to discuss w/ medical services further goals of care  Subjective/Objective   Chief Complaint:"My throat hurts to talk "    Subjective:  Received call by patient's nurse at approximately 3:38am this morning and I spoke with patient's nurse at that time and nurse reported patient would not arouse or open eyes and nurse did sternal rub and reported "V-Tach"   I advised nurse to immediately contact [facility]IM to evaluate patient and for SLIM to consider CT Head too  I spoke with Dr Rissa Moore who additionally recommended patient be evaluated by Critical Care Medicine at that time to medically stabilize patient and I immediately called (approximately 3:43am) and relayed this recommendation to patient's nurse to inform [facility]  Hospitalist/SLIM evaluated patient -- please refer to hospitalist note  Currently -- Patient reports "my throat hurts to talk"  She reports "little bit" when asked if she has headache  When asked if she has any blurry vision she reports "a little bit" and reports "watering" of her eyes  She denies nausea or vomiting  She reports "it hurts" and points to abdomen with her right hand  Patient denies any chest pain currently and tells me she did not have chest pain last night (although hospitalist note from early this morning indicated she reported "a little bit" earlier this morning)  Patient demonstrates limited participating with examination as she says "why are you asking me all these questions because I'm getting tried answering them" and eventually says "leave me alone" during examination  Patient verbalized "I just want to die"  Objective: Patient laying in bed - awakens to name; NAD  NG tube present  I/O       11/09 0701 - 11/10 0700 11/10 0701 - 11/11 0700 11/11 0701 - 11/12 0700    P  O   0     I V  (mL/kg) 690 (8 8)      Total Intake(mL/kg) 690 (8 8) 0 (0)     Urine (mL/kg/hr)  1008 (0 5)     Stool  0 (0)     Total Output   1008      Net +690 -1008             Unmeasured Urine Occurrence  2 x     Unmeasured Stool Occurrence 1 x 1 x           Invasive Devices     Peripheral Intravenous Line            Peripheral IV 11/09/17 Right Wrist 1 day    Peripheral IV 11/11/17 Left Hand less than 1 day          Drain            NG/OG/Enteral Tube Nasogastric 18 Fr Left nares less than 1 day                Physical Exam:  Vitals: Blood pressure 170/76, pulse 55, temperature 97 8 °F (36 6 °C), temperature source Oral, resp  rate 18, weight 78 3 kg (172 lb 9 9 oz), SpO2 96 %  ,Body mass index is 31 57 kg/m²  General appearance:  Laying semi-reclined in bed  Awakens to name being called  Alert and oriented to to self, place [facility]"), month ("November") / year ("2017")  Speech is soft and slightly dysarthric  Mouth is dry  She has an NG tube  She counts fingers correct  She identifies pen and reports  function used to write  When asked to name current [place] president patient reports "I don't care" and does not name current [address] [facility] [facility] president  She provides limited participation with examination and eventually says "leaves me alone"    Head: Normocephalic, without obvious abnormality  Eyes: PERRLA  Some tearing from both eyes  Lungs: non labored breathing  Neurologic:   Cranial nerves:PERRLA  She does not participate with formal EOM exam of eyes  She appears to have a preference to look towards right  Left eye did not appear to grossly cross midline when she was tracking me in room  Sensation grossly intact to light touch of face b/l  She refuses to complete facial expression motor examination  Her left NLF appears flattened compared to right  She opens mouth when asked although minimally and therefore unable to evaluate palate / uvula  Her tongue appears midline on protrusion  Sensory: Patient reports gross sensation to light touch intact bilaterally of upper extremities, torso, and lower extremities  Motor:    RUE:  Right shoulder abduction -- patient refuses formal participation although did elevate right arm off pillow when asked to give me a high-five with right hand/arm  Right elbow flexion / extension 4-/5  LUE: No movement in hand or proximal arm  Left hand fisted  Spasticity of LUE  LLE: 1/5 movement in quads  She wiggles left foot with DF/PF at left ankle minimally  She minimally lifts left heel off bed  Wiggles toes    RLE: She wiggles right foot with DF/PF at right ankle  She minimally lifts left heel off bed  She wiggles toes  She refused to attempt proximal right leg eval and says "leave me alone"    Patient refused to participate in further physical examination  Lab Results:    Results from last 7 days  Lab Units 11/10/17  0702 11/09/17  1026   WBC Thousand/uL 9 71 7 10   HEMOGLOBIN g/dL 13 0 12 9   HEMATOCRIT % 39 0 40 2   PLATELETS Thousands/uL 170 202   NEUTROS PCT %  --  73   MONOS PCT %  --  6       Results from last 7 days  Lab Units 11/11/17  0401 11/11/17  0113 11/10/17  2034  11/10/17  0702 11/09/17  1026   SODIUM mmol/L 153* 150* 152*  < > 151* 151*   POTASSIUM mmol/L 4 5  --   --   --  4 2 3 6   CHLORIDE mmol/L 122*  --   --   --  120* 118*   CO2 mmol/L 23  --   --   --  20* 24   BUN mg/dL 39*  --   --   --  32* 33*   CREATININE mg/dL 1 44*  --   --   --  1 46* 1 44*   CALCIUM mg/dL 9 3  --   --   --  9 5 9 2   TOTAL PROTEIN g/dL  --   --   --   --   --  6 8   BILIRUBIN TOTAL mg/dL  --   --   --   --   --  0 80   ALK PHOS U/L  --   --   --   --   --  65   ALT U/L  --   --   --   --   --  25   AST U/L  --   --   --   --   --  27   GLUCOSE RANDOM mg/dL 159*  --   --   --  128 109   < > = values in this interval not displayed  Results from last 7 days  Lab Units 11/11/17  0401 11/10/17  0702   MAGNESIUM mg/dL 2 4 2 4           Results from last 7 days  Lab Units 11/09/17  1026   INR  1 16   PTT seconds 28     No results found for: TROPONINT  ABG:No results found for: PHART, DOZ4QSR, PO2ART, IIA4JKT, B0FTAYAL, BEART, SOURCE    Imaging Studies: I have personally reviewed pertinent reports  and I have personally reviewed pertinent films in PACS    EKG, Pathology, and Other Studies: I have personally reviewed pertinent reports        VTE Pharmacologic Prophylaxis: Sequential compression device (Venodyne)

## 2017-11-11 NOTE — ASSESSMENT & PLAN NOTE
Assessment: acute most likely secondary to compression secondary to multiple intracranial lesion and vasogenic edema  -after today evaluation patient does not follow command, she is not talkative, she is not oriented x3    As per Neurosurgery patient is not a candidate for any procedure at this time     Plan:   -keep on Decadron  -continue monitor neuro checks closely-  -Negative surgery no plans for surgical intervention at this time  -follow-up palliative care recommendation

## 2017-11-11 NOTE — ASSESSMENT & PLAN NOTE
Assessment:  Patient was found to have a ventricular tachycardia  Current adequate heart rate  No electrolyte imbalance    Negative troponin   Plan:    -Keep on beta-blockers  -close monitoring as needed  -keep on telemetry

## 2017-11-11 NOTE — PROGRESS NOTES
Progress Note - Angel Lockwood 75 y o  female MRN: [id number]    Unit/Bed#: [facility] 907-01 Encounter: [id number]        Acute encephalopathy   Assessment & Plan       Assessment: acute most likely secondary to compression secondary to multiple intracranial lesion and vasogenic edema  -after today evaluation patient does not follow command, she is not talkative, she is not oriented x3  As per Neurosurgery patient is not a candidate for any procedure at this time     Plan:   -keep on Decadron  -continue monitor neuro checks closely-  -Negative surgery no plans for surgical intervention at this time  -follow-up palliative care recommendation          Ventricular tachyarrhythmia [facility])   Assessment & Plan       Assessment:  Patient was found to have a ventricular tachycardia  Current adequate heart rate  No electrolyte imbalance  Negative troponin   Plan:    -Keep on beta-blockers  -close monitoring as needed  -keep on telemetry        Hypernatremia   Assessment & Plan    Keep on half normal saline  Closely monitor  Follow BMP        Symptomatic Parkinson disease ([address] )   Assessment & Plan       Assessment: continue with home medication         Hypothyroidism   Assessment & Plan       Assessment:stable  Keep on Levothyroxine         Dysphagia   Assessment & Plan       Keep on NG tube        Accelerated hypertension   Assessment & Plan       Assessment:  Stable  Georgette Ormond Keep on beta-blockers        Chronic kidney disease, stage III (moderate)   Assessment & Plan       Assessment: keep on IVF  Closely monitor               VTE Pharmacologic Prophylaxis:   Pharmacologic: Other Medication: SCDs  Mechanical: Mechanical VTE prophylaxis in place  Patient Centered Rounds: I have performed bedside rounds with nursing staff today  Discussions with Specialists or Other Care Team Provider:  Neurosurgery  Education and Discussions with Family / Patient:   Time Spent for Care: 20 minutes    More than 50% of total time spent on counseling and coordination of care as described above  Current Length of Stay: 2 day(s)  Current Patient Status: Inpatient   Certification Statement: The patient will continue to require additional inpatient hospital stay due to Multiple brain mass with central necrosis    Discharge Plan: depend on clinical course  Code Status: Level 1 - Full Code    Subjective:   Patient is somewhat somnolent, hypoactive, non verbal    Objective:   Vitals:   Temp (24hrs), Av 4 °F (36 9 °C), Min:97 7 °F (36 5 °C), Max:99 3 °F (37 4 °C)    HR:  [55-75] 55  Resp:  [17-19] 18  BP: (134-196)/(63-96) 170/76  SpO2:  [94 %-96 %] 96 %  Body mass index is 31 57 kg/m²  Input and Output Summary (last 24 hours): Intake/Output Summary (Last 24 hours) at 17 1013  Last data filed at 17 0616   Gross per 24 hour   Intake                0 ml   Output              709 ml   Net             -709 ml       Physical Exam:     Physical Exam   Constitutional: No distress  Acutely over chronic ill appearance   HENT:   Head: Normocephalic and atraumatic  Eyes: Conjunctivae and EOM are normal  Pupils are equal, round, and reactive to light  Left eye exhibits no discharge  Neck: No JVD present  No tracheal deviation present  No thyromegaly present  Cardiovascular: Regular rhythm and normal heart sounds  Exam reveals no gallop and no friction rub  No murmur heard  Pulmonary/Chest: Effort normal and breath sounds normal  No respiratory distress  She has no wheezes  She has no rales  She exhibits no tenderness  Abdominal: Soft  She exhibits no distension and no mass  There is no tenderness  There is no rebound and no guarding  Musculoskeletal: She exhibits no edema or tenderness  Lymphadenopathy:     She has no cervical adenopathy  Neurological: She displays normal reflexes  She exhibits normal muscle tone  Somnolence , hypoactive, non verbal    Skin: There is pallor     Psychiatric:   Depressed mood Additional Data:   Labs:    Results from last 7 days  Lab Units 11/10/17  0702 11/09/17  1026   WBC Thousand/uL 9 71 7 10   HEMOGLOBIN g/dL 13 0 12 9   HEMATOCRIT % 39 0 40 2   PLATELETS Thousands/uL 170 202   NEUTROS PCT %  --  73   LYMPHS PCT %  --  10*   MONOS PCT %  --  6   EOS PCT %  --  10*       Results from last 7 days  Lab Units 11/11/17  0401  11/09/17  1026   SODIUM mmol/L 153*  < > 151*   POTASSIUM mmol/L 4 5  < > 3 6   CHLORIDE mmol/L 122*  < > 118*   CO2 mmol/L 23  < > 24   BUN mg/dL 39*  < > 33*   CREATININE mg/dL 1 44*  < > 1 44*   CALCIUM mg/dL 9 3  < > 9 2   TOTAL PROTEIN g/dL  --   --  6 8   BILIRUBIN TOTAL mg/dL  --   --  0 80   ALK PHOS U/L  --   --  65   ALT U/L  --   --  25   AST U/L  --   --  27   GLUCOSE RANDOM mg/dL 159*  < > 109   < > = values in this interval not displayed  Results from last 7 days  Lab Units 11/09/17  1026   INR  1 16       * I Have Reviewed All Lab Data Listed Above  * Additional Pertinent Lab Tests Reviewed: All Labs Within Last 24 Hours Reviewed    Imaging:    Imaging Reports Reviewed Today Include:   Cultures:   Blood Culture:   Lab Results   Component Value Date    BLOODCX No Growth After 5 Days  11/27/2016    BLOODCX No Growth After 5 Days  11/27/2016    BLOODCX No Growth After 5 Days  11/22/2016    BLOODCX No Growth After 5 Days   11/22/2016     Urine Culture:   Lab Results   Component Value Date    URINECX 20,000-29,000 cfu/ml Mixed Contaminants X4 11/27/2016    URINECX 40,000-49,000 cfu/ml Mixed Contaminants X3 11/22/2016    URINECX No Growth <1000 cfu/mL 11/21/2016    URINECX 10,000-19,000 cfu/ml Mixed Contaminants X3 06/17/2016     Sputum Culture: No components found for: SPUTUMCX  Wound Culture: No results found for: WOUNDCULT    Last 24 Hours Medication List:     allopurinol 300 mg Oral Daily   calcitriol 0 25 mcg Oral Daily   carbidopa-levodopa 1 tablet Per NG Tube TID   cyanocobalamin 1,000 mcg Oral Daily   dexamethasone 4 mg Intravenous Q6H [address] docusate sodium 100 mg Oral BID   fluticasone 1 spray Each Nare BID   levETIRAcetam 500 mg Intravenous Q12H [address]   levothyroxine 75 mcg Per NG Tube Early Morning   metoprolol tartrate 12 5 mg Per NG Tube Q12H [address]   montelukast 10 mg Per NG Tube HS   polyethylene glycol 17 g Oral Daily   rOPINIRole 0 5 mg Per NG Tube TID   senna 1 tablet Oral Daily        Today, Patient Was Seen By: Bibi Beckwith MD    ** Please Note: Dragon 360 Dictation voice to text software may have been used in the creation of this document   **

## 2017-11-11 NOTE — PROGRESS NOTES
Called by nursing for patient's lethargy and episodes of vtach  Patient was noted to alert and oriented x 2 by nursing in the evening yesterday and reportedly became more confused late last evening and then became more lethargic/ sleepy shortly after 0000  Patient is being monitored on q1h neuro checks and per nursing the patient stated that she was tired overnight  Patient was also noted to have multiple episodes of vtach on telemetry this morning  Physical Exam   Constitutional: She appears lethargic  No distress  Cardiovascular: Normal rate and regular rhythm  Pulmonary/Chest: Effort normal and breath sounds normal  No respiratory distress  Abdominal: Soft  Bowel sounds are normal  She exhibits no distension  Musculoskeletal: She exhibits no edema  Neurological: She appears lethargic  Patient follows simple commands such as raising her eyebrows, sticking out her tongue and strength testing  Right upper and lower extremity strength > left side  Patient appears to intentionally hold her eyelids closed when trying to open them  Patient initially would not answer questions but then became more talkative and was able to answer questions appropriately, oriented x 3  Skin: Skin is warm  She is not diaphoretic  Assessment/ plan:  1  Encephalopathy in patient with vasogenic edema and brain compression secondary to multiple intracranial lesions and with a history of Parkinson's dementia:  · Patient's mental status reportedly waxes and wanes per nursing  · She is able to follow commands at this time and appears to intentionally hold her eyes closed when trying to examine them  Patient initially would not answer questions, but then became more alert and talkative and is oriented x 3 at this time; speech somewhat slurred but comprehensible  She admits to throat pain at this time with NG tube in place and states that she does not want anymore needle sticks     · Continue to monitor neuro-checks closely  · Consider repeat CT head if patient becomes more lethargic  · Resume Sinemet via NG tube this AM    · Neurosurgery following, no plans for surgical intervention  Palliative care consult pending  2  Ventricular tachycardia:  · Multiple episodes this AM    · K and mag within normal limits  · When asked if the patient has chest pain, she reports "a little bit "  · Obtain troponin level and repeat EKG this AM   · Start metoprolol 12 5 mg BID  · Patient with recent echo on 11/10/17 which showed an EF of 60%, moderately increased wall thickness and concentric hypertrophy  · Last stress test was in November 2016 and this was a normal study  · Consider cardiology consult  · Continue to monitor on telemetry  3  Hypernatremia:   · Na noted to be worsening this AM, currently 153  · Discontinue 0 45% normal saline and change to D5 with 0 2% normal saline at 75 mL/hr  · Monitor Na q6h

## 2017-11-11 NOTE — SUBJECTIVE & OBJECTIVE
VTE Pharmacologic Prophylaxis:   Pharmacologic: Other Medication: SCDs  Mechanical: Mechanical VTE prophylaxis in place  Patient Centered Rounds: I have performed bedside rounds with nursing staff today  Discussions with Specialists or Other Care Team Provider:  Neurosurgery  Education and Discussions with Family / Patient:   Time Spent for Care: 20 minutes  More than 50% of total time spent on counseling and coordination of care as described above  Current Length of Stay: 2 day(s)  Current Patient Status: Inpatient   Certification Statement: The patient will continue to require additional inpatient hospital stay due to Multiple brain mass with central necrosis    Discharge Plan: depend on clinical course  Code Status: Level 1 - Full Code    Subjective:   Patient is somewhat somnolent, hypoactive, non verbal    Objective:   Vitals:   Temp (24hrs), Av 4 °F (36 9 °C), Min:97 7 °F (36 5 °C), Max:99 3 °F (37 4 °C)    HR:  [55-75] 55  Resp:  [17-19] 18  BP: (134-196)/(63-96) 170/76  SpO2:  [94 %-96 %] 96 %  Body mass index is 31 57 kg/m²  Input and Output Summary (last 24 hours): Intake/Output Summary (Last 24 hours) at 17 1013  Last data filed at 17 0616   Gross per 24 hour   Intake                0 ml   Output              709 ml   Net             -709 ml       Physical Exam:     Physical Exam   Constitutional: No distress  Acutely over chronic ill appearance   HENT:   Head: Normocephalic and atraumatic  Eyes: Conjunctivae and EOM are normal  Pupils are equal, round, and reactive to light  Left eye exhibits no discharge  Neck: No JVD present  No tracheal deviation present  No thyromegaly present  Cardiovascular: Regular rhythm and normal heart sounds  Exam reveals no gallop and no friction rub  No murmur heard  Pulmonary/Chest: Effort normal and breath sounds normal  No respiratory distress  She has no wheezes  She has no rales  She exhibits no tenderness     Abdominal: Soft  She exhibits no distension and no mass  There is no tenderness  There is no rebound and no guarding  Musculoskeletal: She exhibits no edema or tenderness  Lymphadenopathy:     She has no cervical adenopathy  Neurological: She displays normal reflexes  She exhibits normal muscle tone  Somnolence , hypoactive, non verbal    Skin: There is pallor  Psychiatric:   Depressed mood        Additional Data:   Labs:    Results from last 7 days  Lab Units 11/10/17  0702 11/09/17  1026   WBC Thousand/uL 9 71 7 10   HEMOGLOBIN g/dL 13 0 12 9   HEMATOCRIT % 39 0 40 2   PLATELETS Thousands/uL 170 202   NEUTROS PCT %  --  73   LYMPHS PCT %  --  10*   MONOS PCT %  --  6   EOS PCT %  --  10*       Results from last 7 days  Lab Units 11/11/17  0401  11/09/17  1026   SODIUM mmol/L 153*  < > 151*   POTASSIUM mmol/L 4 5  < > 3 6   CHLORIDE mmol/L 122*  < > 118*   CO2 mmol/L 23  < > 24   BUN mg/dL 39*  < > 33*   CREATININE mg/dL 1 44*  < > 1 44*   CALCIUM mg/dL 9 3  < > 9 2   TOTAL PROTEIN g/dL  --   --  6 8   BILIRUBIN TOTAL mg/dL  --   --  0 80   ALK PHOS U/L  --   --  65   ALT U/L  --   --  25   AST U/L  --   --  27   GLUCOSE RANDOM mg/dL 159*  < > 109   < > = values in this interval not displayed  Results from last 7 days  Lab Units 11/09/17  1026   INR  1 16       * I Have Reviewed All Lab Data Listed Above  * Additional Pertinent Lab Tests Reviewed: All Labs Within Last 24 Hours Reviewed    Imaging:    Imaging Reports Reviewed Today Include:   Cultures:   Blood Culture:   Lab Results   Component Value Date    BLOODCX No Growth After 5 Days  11/27/2016    BLOODCX No Growth After 5 Days  11/27/2016    BLOODCX No Growth After 5 Days  11/22/2016    BLOODCX No Growth After 5 Days   11/22/2016     Urine Culture:   Lab Results   Component Value Date    URINECX 20,000-29,000 cfu/ml Mixed Contaminants X4 11/27/2016    URINECX 40,000-49,000 cfu/ml Mixed Contaminants X3 11/22/2016    URINECX No Growth <1000 cfu/mL 11/21/2016 URINECX 10,000-19,000 cfu/ml Mixed Contaminants X3 06/17/2016     Sputum Culture: No components found for: SPUTUMCX  Wound Culture: No results found for: WOUNDCULT    Last 24 Hours Medication List:     allopurinol 300 mg Oral Daily   calcitriol 0 25 mcg Oral Daily   carbidopa-levodopa 1 tablet Per NG Tube TID   cyanocobalamin 1,000 mcg Oral Daily   dexamethasone 4 mg Intravenous Q6H [address]   docusate sodium 100 mg Oral BID   fluticasone 1 spray Each Nare BID   levETIRAcetam 500 mg Intravenous Q12H [address]   levothyroxine 75 mcg Per NG Tube Early Morning   metoprolol tartrate 12 5 mg Per NG Tube Q12H [address]   montelukast 10 mg Per NG Tube HS   polyethylene glycol 17 g Oral Daily   rOPINIRole 0 5 mg Per NG Tube TID   senna 1 tablet Oral Daily        Today, Patient Was Seen By: Tera Yoder MD    ** Please Note: Dragon 360 Dictation voice to text software may have been used in the creation of this document   **

## 2017-11-12 LAB
ANION GAP SERPL CALCULATED.3IONS-SCNC: 8 MMOL/L (ref 4–13)
BUN SERPL-MCNC: 29 MG/DL (ref 5–25)
CALCIUM SERPL-MCNC: 8.9 MG/DL (ref 8.3–10.1)
CHLORIDE SERPL-SCNC: 112 MMOL/L (ref 100–108)
CO2 SERPL-SCNC: 21 MMOL/L (ref 21–32)
CREAT SERPL-MCNC: 1.13 MG/DL (ref 0.6–1.3)
GFR SERPL CREATININE-BSD FRML MDRD: 50 ML/MIN/1.73SQ M
GLUCOSE SERPL-MCNC: 195 MG/DL (ref 65–140)
POTASSIUM SERPL-SCNC: 3.8 MMOL/L (ref 3.5–5.3)
SODIUM SERPL-SCNC: 139 MMOL/L (ref 136–145)
SODIUM SERPL-SCNC: 141 MMOL/L (ref 136–145)
SODIUM SERPL-SCNC: 143 MMOL/L (ref 136–145)
SODIUM SERPL-SCNC: 146 MMOL/L (ref 136–145)

## 2017-11-12 PROCEDURE — 84295 ASSAY OF SERUM SODIUM: CPT | Performed by: PHYSICIAN ASSISTANT

## 2017-11-12 PROCEDURE — 80048 BASIC METABOLIC PNL TOTAL CA: CPT | Performed by: FAMILY MEDICINE

## 2017-11-12 RX ORDER — MONTELUKAST SODIUM 10 MG/1
10 TABLET ORAL
Status: DISCONTINUED | OUTPATIENT
Start: 2017-11-12 | End: 2017-11-13 | Stop reason: HOSPADM

## 2017-11-12 RX ORDER — LORAZEPAM 1 MG/1
1 TABLET ORAL EVERY 4 HOURS PRN
Status: DISCONTINUED | OUTPATIENT
Start: 2017-11-12 | End: 2017-11-13 | Stop reason: HOSPADM

## 2017-11-12 RX ORDER — MORPHINE SULFATE 100 MG/5ML
10 SOLUTION ORAL EVERY 4 HOURS PRN
Status: DISCONTINUED | OUTPATIENT
Start: 2017-11-12 | End: 2017-11-13 | Stop reason: HOSPADM

## 2017-11-12 RX ORDER — ROPINIROLE 1 MG/1
0.5 TABLET, FILM COATED ORAL 3 TIMES DAILY
Status: DISCONTINUED | OUTPATIENT
Start: 2017-11-12 | End: 2017-11-13 | Stop reason: HOSPADM

## 2017-11-12 RX ORDER — MORPHINE SULFATE 100 MG/5ML
5 SOLUTION ORAL EVERY 8 HOURS
Status: DISCONTINUED | OUTPATIENT
Start: 2017-11-12 | End: 2017-11-13 | Stop reason: HOSPADM

## 2017-11-12 RX ORDER — SENNOSIDES 8.8 MG/5ML
8.8 LIQUID ORAL 2 TIMES DAILY
Status: DISCONTINUED | OUTPATIENT
Start: 2017-11-12 | End: 2017-11-13 | Stop reason: HOSPADM

## 2017-11-12 RX ORDER — DEXAMETHASONE SODIUM PHOSPHATE 4 MG/ML
4 INJECTION, SOLUTION INTRA-ARTICULAR; INTRALESIONAL; INTRAMUSCULAR; INTRAVENOUS; SOFT TISSUE EVERY 6 HOURS SCHEDULED
Status: DISCONTINUED | OUTPATIENT
Start: 2017-11-12 | End: 2017-11-12

## 2017-11-12 RX ORDER — LEVOTHYROXINE SODIUM 0.07 MG/1
75 TABLET ORAL
Status: DISCONTINUED | OUTPATIENT
Start: 2017-11-13 | End: 2017-11-13 | Stop reason: HOSPADM

## 2017-11-12 RX ORDER — HEPARIN SODIUM 5000 [USP'U]/ML
5000 INJECTION, SOLUTION INTRAVENOUS; SUBCUTANEOUS EVERY 12 HOURS SCHEDULED
Status: DISCONTINUED | OUTPATIENT
Start: 2017-11-12 | End: 2017-11-13

## 2017-11-12 RX ADMIN — ACETAMINOPHEN 650 MG: 325 TABLET, FILM COATED ORAL at 05:10

## 2017-11-12 RX ADMIN — DEXAMETHASONE SODIUM PHOSPHATE 4 MG: 4 INJECTION, SOLUTION INTRAMUSCULAR; INTRAVENOUS at 11:12

## 2017-11-12 RX ADMIN — DEXAMETHASONE SODIUM PHOSPHATE 4 MG: 4 INJECTION, SOLUTION INTRAMUSCULAR; INTRAVENOUS at 05:10

## 2017-11-12 RX ADMIN — MORPHINE SULFATE 10 MG: 100 SOLUTION ORAL at 22:27

## 2017-11-12 RX ADMIN — LEVETIRACETAM 500 MG: 100 INJECTION, SOLUTION INTRAVENOUS at 22:45

## 2017-11-12 RX ADMIN — CYANOCOBALAMIN TAB 500 MCG 1000 MCG: 500 TAB at 08:13

## 2017-11-12 RX ADMIN — HEPARIN SODIUM 5000 UNITS: 5000 INJECTION, SOLUTION INTRAVENOUS; SUBCUTANEOUS at 09:55

## 2017-11-12 RX ADMIN — LEVOTHYROXINE SODIUM 75 MCG: 75 TABLET ORAL at 05:10

## 2017-11-12 RX ADMIN — LEVETIRACETAM 500 MG: 100 INJECTION, SOLUTION INTRAVENOUS at 08:22

## 2017-11-12 RX ADMIN — ALLOPURINOL 300 MG: 300 TABLET ORAL at 08:09

## 2017-11-12 RX ADMIN — ROPINIROLE 0.5 MG: 1 TABLET, FILM COATED ORAL at 17:35

## 2017-11-12 RX ADMIN — MORPHINE SULFATE 5 MG: 100 SOLUTION ORAL at 17:35

## 2017-11-12 RX ADMIN — ROPINIROLE 0.5 MG: 1 TABLET, FILM COATED ORAL at 08:13

## 2017-11-12 RX ADMIN — CARBIDOPA AND LEVODOPA 1 TABLET: 25; 100 TABLET ORAL at 08:14

## 2017-11-12 RX ADMIN — CARBIDOPA AND LEVODOPA 1 TABLET: 25; 100 TABLET ORAL at 17:36

## 2017-11-12 RX ADMIN — DEXAMETHASONE SODIUM PHOSPHATE 4 MG: 4 INJECTION, SOLUTION INTRAMUSCULAR; INTRAVENOUS at 00:02

## 2017-11-12 RX ADMIN — DEXTROSE AND SODIUM CHLORIDE 75 ML/HR: 5; .2 INJECTION, SOLUTION INTRAVENOUS at 05:27

## 2017-11-12 NOTE — PROGRESS NOTES
Pt's HR into the 30's, as low as 36 on telemetry  Went into assess pt  Pt responds to voice, A&Ox3, complains of difficulty breathing  Pt lungs clear diminished, equal rate of breathing, sat 97% on RA, doesn't appear to be in any distress  -72  SLIM paged and notified  Will be up to see pt  Will continue to monitor

## 2017-11-12 NOTE — CONSULTS
Consultation - Cardiology   Jackelyn Vigil 75 y o  female MRN: [id number]  Unit/Bed#: [facility] 907-01 Encounter: [id number]      Assessment:  Principal Problem:    Brain mass  Active Problems:    Chronic kidney disease, stage III (moderate)    Hypothyroidism    Symptomatic Parkinson disease (HCC)    Gout    Accelerated hypertension    Hypernatremia    Dysphagia    Acute encephalopathy    Facial droop    Class 1 obesity in adult    Diastolic heart failure (HCC)    Ventricular tachyarrhythmia ([address] )      1  Hypertension  2  Hypothyroidism   3  Parkinson's disease  4  CKD stage 3  5  Diastolic heart failure- Echo 11/17, EF 60% with concentric hypertrophy, no significant valvular dysfunction  NM perfusion scan 2016- Normal    7  Gout  8  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 Plan:    Cardiology consulted for what was thought to be episodes of Vtach overnight  On review of telemetry, noted to have multiple artifacts from motion but no Vtach  Closely monitor for electrolyte imbalances as oral intake is restricted due to poor mental status  Agree with obtaining palliative and hospice consult  Cardiology will sign off, please call with questions  History of Present Illness   Physician Requesting Consult: Tim Blanca,*  Reason for Consult / Principal Problem:  Vtach  HPI: Jackelyn Vigil is a 75y o  year old female with past medical history of hypertension, parkinsonism, hypothyroidism, diastolic heart failure transferred by her PCP Dr Janice Cole initially to the [facility] for left-sided weakness, dysphagia, facial asymmetry  Her CT head and MRI revealed three intracranial lesions from 13-35 mm with central necrosis minor hemorrhage and local mass effect    Transferred to [facility] for further neurosurgical evaluation  Also found to have enlarged uterus with endometrial thickening, being evaluated for possible endometrial carcinoma with metastasis  So far there has been no plans for surgical interventions due to patient's poor functional status  Gyn onc and palliative care consultation pending  Patient is unable to participate with history due to confusion  Review of nursing facility records reveals slow progression of left-sided weakness and dysphagia over the last 2 weeks with minimal oral intake  Was found to be dehydrated and hyponatremic on admission potassium was 153, creatinine of 1 4  EKG- Normal sinus rhythm, PAC, poor R wave progression  Troponins x2 negative    Inpatient consult to Cardiology  Performed by: Gonzalez Metz  Authorized by: Kevin Erwin           Review of Systems:  Review of Systems  Unable to obtain ROS due to her mental status    Historical Information   Past Medical History:   Diagnosis Date    Arthritis     Edema leg     Gout     Hypertension     Kidney disease     Parkinson disease ([facility] [address] )      Past Surgical History:   Procedure Laterality Date    CHOLECYSTECTOMY LAPAROSCOPIC N/A 11/30/2016    Procedure: CHOLECYSTECTOMY LAPAROSCOPIC;  Surgeon: Nava Ngo MD;  Location: [address] [place] [address];  Service:      History   Alcohol use Not on file     History   Drug use: Unknown     History   Smoking Status    Unknown If Ever Smoked   Smokeless Tobacco    Not on file     Family History: non-contributory    Meds/Allergies   PTA meds:   Prior to Admission Medications   Prescriptions Last Dose Informant Patient Reported? Taking?    Efinaconazole (JUBLIA) 10 % SOLN   Yes No   Sig: Apply topically   Glycopyrrolate (CUVPOSA) 1 MG/5ML SOLN   Yes No   Sig: Take by mouth 3 (three) times a day   allopurinol (ZYLOPRIM) 300 mg tablet   Yes No   Sig: Take 300 mg by mouth daily   calcitriol (ROCALTROL) 0 25 mcg capsule   Yes No   Sig: Take 0 25 mcg by mouth daily carbidopa-levodopa (PARCOPA)  mg per disintegrating tablet   Yes No   Sig: Take 1 tablet by mouth 5 (five) times a day   cyanocobalamin (VITAMIN B-12) 1,000 mcg tablet   Yes No   Sig: Take 1,000 mcg by mouth daily   docusate sodium (COLACE) 100 mg capsule   Yes No   Sig: Take 100 mg by mouth 2 (two) times a day   fluticasone (VERAMYST) 27 5 MCG/SPRAY nasal spray   Yes No   Si sprays into each nostril daily   furosemide (LASIX) 40 mg tablet   Yes No   Sig: Take 40 mg by mouth 2 (two) times a day   levothyroxine 75 mcg tablet   Yes No   Sig: Take 75 mcg by mouth daily   lisinopril (ZESTRIL) 10 mg tablet   Yes No   Sig: Take 10 mg by mouth daily   montelukast (SINGULAIR) 10 mg tablet   Yes No   Sig: Take 10 mg by mouth daily at bedtime   ondansetron (ZOFRAN) 4 mg tablet   Yes No   Sig: Take 4 mg by mouth every 6 (six) hours as needed for nausea or vomiting   rOPINIRole (REQUIP) 0 5 mg tablet   Yes No   Sig: Take 0 5 mg by mouth 3 (three) times a day   traMADol (ULTRAM) 50 mg tablet   Yes No   Sig: Take 50 mg by mouth every 6 (six) hours as needed for moderate pain      Facility-Administered Medications: None     Allergies   Allergen Reactions    Statins        Objective   Vitals: Blood pressure (!) 171/71, pulse (!) 42, temperature 98 3 °F (36 8 °C), temperature source Axillary, resp  rate 16, weight 78 3 kg (172 lb 9 9 oz), SpO2 97 %  , Body mass index is 31 57 kg/m² , Orthostatic Blood Pressures    Flowsheet Row Most Recent Value   Blood Pressure   171/71 filed at 2017 0730   Patient Position - Orthostatic VS  Lying filed at 2017 0730            Intake/Output Summary (Last 24 hours) at 17 0919  Last data filed at 17 0601   Gross per 24 hour   Intake          2332 25 ml   Output              644 ml   Net          1688 25 ml       Invasive Devices     Peripheral Intravenous Line            Peripheral IV 17 Left Hand 1 day    Peripheral IV 17 Right Forearm less than 1 day Drain            NG/OG/Enteral Tube Nasogastric 18 Fr Left nares 1 day                      Physical Exam    Gen: Does not follow commands  HEENT: NG tube in place  Neck: supple, no jugular venous distention, or carotid bruit  Heart: regular, normal s1 and s2, no murmur/rub or gallop  Lungs : Reduced respiratory effort, no wheezing  Abdomen: soft nontender, normoactive bowel sounds, no organomegaly  Ext: warm and perfused, normal femoral pulses, no edema, clubbing  Neuro:Confused, not following commands  Pupils reactive  Lab Results:     Lab Results   Component Value Date    CKTOTAL 59 03/04/2016    TROPONINI <0 02 11/11/2017    TROPONINI <0 02 11/11/2017       Lab Results   Component Value Date    GLUCOSE 159 (H) 11/11/2017    CALCIUM 9 3 11/11/2017     11/12/2017    K 4 5 11/11/2017    CO2 23 11/11/2017     (H) 11/11/2017    BUN 39 (H) 11/11/2017    CREATININE 1 44 (H) 11/11/2017       Lab Results   Component Value Date    WBC 9 71 11/10/2017    HGB 13 0 11/10/2017    HCT 39 0 11/10/2017    MCV 88 11/10/2017     11/10/2017       Lab Results   Component Value Date    CHOL 132 11/24/2016     Lab Results   Component Value Date    HDL 16 (L) 11/24/2016     Lab Results   Component Value Date    LDLCALC 84 11/24/2016     Lab Results   Component Value Date    TRIG 158 (H) 11/24/2016       Lab Results   Component Value Date    ALT 25 11/09/2017    AST 27 11/09/2017         Results from last 7 days  Lab Units 11/09/17  1026   INR  1 16         Imaging: I have personally reviewed pertinent reports

## 2017-11-12 NOTE — PROGRESS NOTES
Progress Note - More Rodriguez 75 y o  female MRN: [id number]    Unit/Bed#: [facility] 907-01 Encounter: [id number]        Acute encephalopathy   Assessment & Plan       Assessment: acute most likely secondary to compression secondary to multiple intracranial lesion and vasogenic edema  -after today evaluation patient does not follow command, she is not talkative, she is not oriented x3  As per Neurosurgery patient is not a candidate for any procedure at this time     Plan:   -keep on Decadron  -continue monitor neuro checks closely-  -Negative surgery no plans for surgical intervention at this time  -follow-up palliative care recommendation          Ventricular tachyarrhythmia [facility])   Assessment & Plan       Assessment:  Patient was found to have a ventricular tachycardia  Most likely sinu sick syndrome  Current adequate heart rate  No electrolyte imbalance  Negative troponin   Plan:    -Keep on beta-blockers  -Start on Heparin  -close monitoring as needed  -keep on telemetry  -Follow up cardiology consult         Hypernatremia   Assessment & Plan    Keep on half normal saline  Closely monitor  Follow BMP        Symptomatic Parkinson disease [facility])   Assessment & Plan       Assessment: continue with home medication         Dysphagia   Assessment & Plan       Keep on NG tube  Follow up nutrition recommendation         Hypothyroidism   Assessment & Plan       Assessment:stable  Keep on Levothyroxine         Chronic kidney disease, stage III (moderate)   Assessment & Plan       Assessment: keep on IVF  -Follow up BMP  Closely monitor         * Brain mass   Assessment & Plan                     VTE Pharmacologic Prophylaxis:   Pharmacologic: Heparin  Mechanical: Mechanical VTE prophylaxis in place  Patient Centered Rounds: I have performed bedside rounds with nursing staff today    Discussions with Specialists or Other Care Team Provider:   Education and Discussions with Family / Patient:   Time Spent for Care: 20 minutes  More than 50% of total time spent on counseling and coordination of care as described above  Current Length of Stay: 3 day(s)  Current Patient Status: Inpatient   Certification Statement: The patient will continue to require additional inpatient hospital stay due to multiples brain mass with central necrosis    Discharge Plan: depend on clinical course  Code Status: Level 1 - Full Code    Subjective:   Patient evaluated at bed side she is able to follow some command as open eyes, raise hand     Objective:   Vitals:   Temp (24hrs), Av 4 °F (36 9 °C), Min:97 6 °F (36 4 °C), Max:99 °F (37 2 °C)    HR:  [39-59] 42  Resp:  [16-18] 16  BP: (148-189)/(42-88) 171/71  SpO2:  [96 %-97 %] 97 %  Body mass index is 31 57 kg/m²  Input and Output Summary (last 24 hours): Intake/Output Summary (Last 24 hours) at 17 0937  Last data filed at 17 0601   Gross per 24 hour   Intake          2332 25 ml   Output              644 ml   Net          1688 25 ml       Physical Exam:     Physical Exam   Constitutional: No distress  Cardiovascular: Exam reveals no gallop and no friction rub  Irregular rhythm   Pulmonary/Chest: Effort normal and breath sounds normal  No respiratory distress  She has no wheezes  She has no rales  She exhibits no tenderness  Abdominal: Soft  Bowel sounds are normal  She exhibits no distension and no mass  There is no tenderness  There is no rebound and no guarding  Neurological: She is alert  Follow up command    Skin: Skin is warm  She is not diaphoretic         Additional Data:   Labs:    Results from last 7 days  Lab Units 11/10/17  0702 17  1026   WBC Thousand/uL 9 71 7 10   HEMOGLOBIN g/dL 13 0 12 9   HEMATOCRIT % 39 0 40 2   PLATELETS Thousands/uL 170 202   NEUTROS PCT %  --  73   LYMPHS PCT %  --  10*   MONOS PCT %  --  6   EOS PCT %  --  10*       Results from last 7 days  Lab Units 17  0614  17  0401  17  1026   SODIUM mmol/L 143  < > 153*  < > 151*   POTASSIUM mmol/L  --   --  4 5  < > 3 6   CHLORIDE mmol/L  --   --  122*  < > 118*   CO2 mmol/L  --   --  23  < > 24   BUN mg/dL  --   --  39*  < > 33*   CREATININE mg/dL  --   --  1 44*  < > 1 44*   CALCIUM mg/dL  --   --  9 3  < > 9 2   TOTAL PROTEIN g/dL  --   --   --   --  6 8   BILIRUBIN TOTAL mg/dL  --   --   --   --  0 80   ALK PHOS U/L  --   --   --   --  65   ALT U/L  --   --   --   --  25   AST U/L  --   --   --   --  27   GLUCOSE RANDOM mg/dL  --   --  159*  < > 109   < > = values in this interval not displayed  Results from last 7 days  Lab Units 11/09/17  1026   INR  1 16       * I Have Reviewed All Lab Data Listed Above  * Additional Pertinent Lab Tests Reviewed: All Labs Within Last 24 Hours Reviewed    Imaging:    Imaging Reports Reviewed Today Include:   Cultures:   Blood Culture:   Lab Results   Component Value Date    BLOODCX No Growth After 5 Days  11/27/2016    BLOODCX No Growth After 5 Days  11/27/2016    BLOODCX No Growth After 5 Days  11/22/2016    BLOODCX No Growth After 5 Days   11/22/2016     Urine Culture:   Lab Results   Component Value Date    URINECX 20,000-29,000 cfu/ml Mixed Contaminants X4 11/27/2016    URINECX 40,000-49,000 cfu/ml Mixed Contaminants X3 11/22/2016    URINECX No Growth <1000 cfu/mL 11/21/2016    URINECX 10,000-19,000 cfu/ml Mixed Contaminants X3 06/17/2016     Sputum Culture: No components found for: SPUTUMCX  Wound Culture: No results found for: WOUNDCULT    Last 24 Hours Medication List:     allopurinol 300 mg Oral Daily   calcitriol 0 25 mcg Oral Daily   carbidopa-levodopa 1 tablet Per NG Tube TID   cyanocobalamin 1,000 mcg Oral Daily   dexamethasone 4 mg Intravenous Q6H [address]   dexamethasone 4 mg Intravenous Q6H [address]   docusate sodium 100 mg Oral BID   fluticasone 1 spray Each Nare BID   heparin (porcine) 5,000 Units Subcutaneous Q12H [address]   levETIRAcetam 500 mg Intravenous Q12H [address]   levothyroxine 75 mcg Per NG Tube Early Morning   metoprolol tartrate 12 5 mg Per NG Tube Q12H [address]   montelukast 10 mg Per NG Tube HS   polyethylene glycol 17 g Oral Daily   rOPINIRole 0 5 mg Per NG Tube TID   senna 1 tablet Oral Daily        Today, Patient Was Seen By: Chu Green MD    ** Please Note: Dragon 360 Dictation voice to text software may have been used in the creation of this document   **

## 2017-11-12 NOTE — CONSULTS
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  Vasogenic edema  Encephalopathy  Severe protein calorie malnutrition  Dysphagia  CKD III  Parkinson's Disease  Debility  Enlarged uterus  HTN  Gout  Arthritis  Hypothyroidism    Plan:  Symptom Management:  Discontinue NG tube and allow for palliative feeds  Continue steroids and seizure prophylaxis  I will make comfort based medications available on a p r n  basis for comment end of life symptoms  Secondary to consisting complaints of pain I will order morphine 5 mg every 3 hours on a schedule to start  I anticipate that the hospice program that she rolls and will continue to make adjustments to optimize her comfort as her symptoms evolve  Goals of Care:  Family meeting held 3pm today with son Eliazar Jonas and daughter in law Brock Bell, and while the patient's granddaughters  The findings of her brain masses with surrounding vasogenic edema were discussed  They were informed that the primary concern is of malignancy  They were made aware of the abnormal findings in her uterus and were also told of the possibility of malignant melanoma as mentioned in her medical records  They are aware of her debilitated status  They recognize the uncertainty of her diagnosis at this point but do not want to put her through a workup only for her not to be able to complete any kind of treatment  Given her overall state she is a very poor candidate for any kind of surgery radiation or chemotherapy  Given the lesions in her brain she has a terminal condition  They are accepting of a comfort based approach her care and are willing to initiate hospice program for her at her long-time nursing facility    I will have the primary service and case management start making arrangements for her transfer back to [place]  History of Present Illness   Physician Requesting Consult: Regis Benson,*  Reason for Consult / Principal Problem: goals of care  Hx and PE limited by: pt's encephalopathy  HPI: Curt Villeda is a 75y o  year old female nursing home resident with Parkinson's Disease who presents to her primary care physician with complaints of left-sided weakness dysphagic and facial asymmetry  A CT scan of her head revealed 2 centrally necrotic masses within the right hemisphere with vasogenic edema present  Upon arrival to the hospital she was found to be dehydrated with electrolyte abnormalities  Apparently for the last 2 weeks she has had very poor oral intake and there were concerns about her increasing dysphagia  She now has an NG tube for nutrition  An MRI of her head for the revealed 3 intracranial lesions with central necrosis minor hemorrhage and local mass effect  There is a concern that this is metastatic disease  She has an enlarged uterus on imaging but the extent to which her family would desire a workup is unknown at this point  She has a daughter Sandip Au who has admitted to some limitations and appears to be deferring decision making to her brother Sabina Fuller  There is also apparently another son named Michelle Milian but he has been in a psychiatric hospital for a long time  The patient was able to tell me that she has pain all over  She was unable to tell me that she was at the hospital   She was unable to tell me what her workup has revealed to date  Inpatient consult to Palliative Care  Consult performed by: Adiel Colmenares ordered by: Kerline Novak        Review of Systems   Constitutional: Positive for activity change, appetite change and fatigue  HENT: Negative  Eyes: Negative  Respiratory: Negative  Cardiovascular: Negative  Gastrointestinal: Negative  Endocrine: Negative  Genitourinary: Negative  Musculoskeletal: Positive for gait problem     Skin: Positive for pallor  Allergic/Immunologic: Negative  Neurological: Positive for weakness  Hematological: Negative  Psychiatric/Behavioral: Negative          Historical Information   Past Medical History:   Diagnosis Date    Arthritis     Edema leg     Gout     Hypertension     Kidney disease     Parkinson disease [facility])      Past Surgical History:   Procedure Laterality Date    CHOLECYSTECTOMY LAPAROSCOPIC N/A 11/30/2016    Procedure: CHOLECYSTECTOMY LAPAROSCOPIC;  Surgeon: Olivia Olivas MD;  Location: [facility];  Service:      Social History     Social History    Marital status:      Spouse name: N/A    Number of children: N/A    Years of education: N/A     Social History Main Topics    Smoking status: Unknown If Ever Smoked    Smokeless tobacco: None    Alcohol use None    Drug use: Unknown    Sexual activity: Not Asked     Other Topics Concern    None     Social History Narrative    None     Family History   Problem Relation Age of Onset    Family history unknown: Yes     Meds/Allergies   all current active meds have been reviewed, current meds:   Current Facility-Administered Medications   Medication Dose Route Frequency    acetaminophen (TYLENOL) tablet 650 mg  650 mg Oral Q6H PRN    allopurinol (ZYLOPRIM) tablet 300 mg  300 mg Oral Daily    calcitriol (ROCALTROL) capsule 0 25 mcg  0 25 mcg Oral Daily    carbidopa-levodopa (SINEMET)  mg per tablet 1 tablet  1 tablet Per NG Tube TID    cyanocobalamin (VITAMIN B-12) tablet 1,000 mcg  1,000 mcg Oral Daily    dexamethasone (DECADRON) injection 4 mg  4 mg Intravenous Q6H [address]    dexamethasone (DECADRON) injection 4 mg  4 mg Intravenous Q6H [address]    dextrose 5 % and sodium chloride 0 2 % infusion  75 mL/hr Intravenous Continuous    docusate sodium (COLACE) capsule 100 mg  100 mg Oral BID    fluticasone (FLONASE) 50 mcg/act nasal spray 1 spray  1 spray Each Nare BID    heparin (porcine) subcutaneous injection 5,000 Units  5,000 Units Subcutaneous Q12H [address]    HYDROmorphone (DILAUDID) 1 mg/mL injection 0 5 mg  0 5 mg Intravenous Q1H PRN    HYDROmorphone (DILAUDID) 1 mg/mL injection 0 5 mg  0 5 mg Intravenous Q4H PRN    HYDROmorphone (DILAUDID) 1 mg/mL injection 1 mg  1 mg Intravenous Q4H PRN    labetalol (NORMODYNE) injection 10 mg  10 mg Intravenous Q4H PRN    levETIRAcetam (KEPPRA) 500 mg in sodium chloride 0 9 % 100 mL IVPB  500 mg Intravenous Q12H [address]    levothyroxine tablet 75 mcg  75 mcg Per NG Tube Early Morning    metoprolol tartrate (LOPRESSOR) partial tablet 12 5 mg  12 5 mg Per NG Tube Q12H [address]    montelukast (SINGULAIR) tablet 10 mg  10 mg Per NG Tube HS    ondansetron (ZOFRAN) injection 4 mg  4 mg Intravenous Q4H PRN    polyethylene glycol (MIRALAX) packet 17 g  17 g Oral Daily    rOPINIRole (REQUIP) tablet 0 5 mg  0 5 mg Per NG Tube TID    senna (SENOKOT) tablet 8 6 mg  1 tablet Oral Daily    and PTA meds:   Prior to Admission Medications   Prescriptions Last Dose Informant Patient Reported? Taking?    Efinaconazole (JUBLIA) 10 % SOLN   Yes No   Sig: Apply topically   Glycopyrrolate (CUVPOSA) 1 MG/5ML SOLN   Yes No   Sig: Take by mouth 3 (three) times a day   allopurinol (ZYLOPRIM) 300 mg tablet   Yes No   Sig: Take 300 mg by mouth daily   calcitriol (ROCALTROL) 0 25 mcg capsule   Yes No   Sig: Take 0 25 mcg by mouth daily   carbidopa-levodopa (PARCOPA)  mg per disintegrating tablet   Yes No   Sig: Take 1 tablet by mouth 5 (five) times a day   cyanocobalamin (VITAMIN B-12) 1,000 mcg tablet   Yes No   Sig: Take 1,000 mcg by mouth daily   docusate sodium (COLACE) 100 mg capsule   Yes No   Sig: Take 100 mg by mouth 2 (two) times a day   fluticasone (VERAMYST) 27 5 MCG/SPRAY nasal spray   Yes No   Si sprays into each nostril daily   furosemide (LASIX) 40 mg tablet   Yes No   Sig: Take 40 mg by mouth 2 (two) times a day   levothyroxine 75 mcg tablet   Yes No   Sig: Take 75 mcg by mouth daily lisinopril (ZESTRIL) 10 mg tablet   Yes No   Sig: Take 10 mg by mouth daily   montelukast (SINGULAIR) 10 mg tablet   Yes No   Sig: Take 10 mg by mouth daily at bedtime   ondansetron (ZOFRAN) 4 mg tablet   Yes No   Sig: Take 4 mg by mouth every 6 (six) hours as needed for nausea or vomiting   rOPINIRole (REQUIP) 0 5 mg tablet   Yes No   Sig: Take 0 5 mg by mouth 3 (three) times a day   traMADol (ULTRAM) 50 mg tablet   Yes No   Sig: Take 50 mg by mouth every 6 (six) hours as needed for moderate pain      Facility-Administered Medications: None     Allergies   Allergen Reactions    Statins      Objective   Vitals:    11/12/17 0730   BP: (!) 171/71   Pulse: (!) 42   Resp: 16   Temp: 98 3 °F (36 8 °C)   SpO2: 97%     Physical Exam   Constitutional:   frail   HENT:   Head: Normocephalic and atraumatic  Mouth/Throat: Oropharynx is clear and moist  No oropharyngeal exudate  Eyes: Right eye exhibits no discharge  Left eye exhibits no discharge  No scleral icterus  Cardiovascular: Normal rate, regular rhythm and normal heart sounds  Pulmonary/Chest: Effort normal and breath sounds normal  No stridor  Abdominal: Soft  Bowel sounds are normal  She exhibits no distension  Musculoskeletal: She exhibits no edema  Stiff joints   Neurological: She is alert  Speech is slow and slurred  She mostly offers one word responses  Skin: Skin is warm and dry  There is pallor  Lab Results:   I have personally reviewed pertinent labs  , CBC: No results found for: WBC, HGB, HCT, MCV, PLT, ADJUSTEDWBC, MCH, MCHC, RDW, MPV, NRBC, CMP:   Lab Results   Component Value Date     11/12/2017     Imaging Studies: I have personally reviewed pertinent reports  Code Status: Level 1 - Full Code  Advance Directive and Living Will: Yes    Power of :    POLST:      Counseling / Coordination of Care  Total floor / unit time spent today 75 minutes   Greater than 50% of total time was spent with the patient and / or family counseling and / or coordination of care   A description of the counseling / coordination of care: See conversations above

## 2017-11-12 NOTE — ASSESSMENT & PLAN NOTE
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

## 2017-11-12 NOTE — SOCIAL WORK
CM met with Pt's family and received the confirmation of their request for Pt to return to [facility] on hospice at time of d/c  CM was in contact with Oli Ozuna and made her aware of the Pt's family request for the Pt to return to their facility on hospice  Nikolas James reported the facility will take on the responsibility of getting the Pt sign on hospice upon her return  Pt is scheduled to be transported by [facility] at 12pm via bls  Chart copied, facility transfer and cmn form completed  CM notified [facility] Dr Anoop Braga and admission at [facility] of d/c arrangements

## 2017-11-12 NOTE — SUBJECTIVE & OBJECTIVE
VTE Pharmacologic Prophylaxis:   Pharmacologic: Heparin  Mechanical: Mechanical VTE prophylaxis in place  Patient Centered Rounds: I have performed bedside rounds with nursing staff today  Discussions with Specialists or Other Care Team Provider:   Education and Discussions with Family / Patient:   Time Spent for Care: 20 minutes  More than 50% of total time spent on counseling and coordination of care as described above  Current Length of Stay: 3 day(s)  Current Patient Status: Inpatient   Certification Statement: The patient will continue to require additional inpatient hospital stay due to multiples brain mass with central necrosis    Discharge Plan: depend on clinical course  Code Status: Level 1 - Full Code    Subjective:   Patient evaluated at bed side she is able to follow some command as open eyes, raise hand     Objective:   Vitals:   Temp (24hrs), Av 4 °F (36 9 °C), Min:97 6 °F (36 4 °C), Max:99 °F (37 2 °C)    HR:  [39-59] 42  Resp:  [16-18] 16  BP: (148-189)/(42-88) 171/71  SpO2:  [96 %-97 %] 97 %  Body mass index is 31 57 kg/m²  Input and Output Summary (last 24 hours): Intake/Output Summary (Last 24 hours) at 17 0937  Last data filed at 17 0601   Gross per 24 hour   Intake          2332 25 ml   Output              644 ml   Net          1688 25 ml       Physical Exam:     Physical Exam   Constitutional: No distress  Cardiovascular: Exam reveals no gallop and no friction rub  Irregular rhythm   Pulmonary/Chest: Effort normal and breath sounds normal  No respiratory distress  She has no wheezes  She has no rales  She exhibits no tenderness  Abdominal: Soft  Bowel sounds are normal  She exhibits no distension and no mass  There is no tenderness  There is no rebound and no guarding  Neurological: She is alert  Follow up command    Skin: Skin is warm  She is not diaphoretic         Additional Data:   Labs:    Results from last 7 days  Lab Units 11/10/17  0702 11/09/17  1026   WBC Thousand/uL 9 71 7 10   HEMOGLOBIN g/dL 13 0 12 9   HEMATOCRIT % 39 0 40 2   PLATELETS Thousands/uL 170 202   NEUTROS PCT %  --  73   LYMPHS PCT %  --  10*   MONOS PCT %  --  6   EOS PCT %  --  10*       Results from last 7 days  Lab Units 11/12/17  0614  11/11/17  0401  11/09/17  1026   SODIUM mmol/L 143  < > 153*  < > 151*   POTASSIUM mmol/L  --   --  4 5  < > 3 6   CHLORIDE mmol/L  --   --  122*  < > 118*   CO2 mmol/L  --   --  23  < > 24   BUN mg/dL  --   --  39*  < > 33*   CREATININE mg/dL  --   --  1 44*  < > 1 44*   CALCIUM mg/dL  --   --  9 3  < > 9 2   TOTAL PROTEIN g/dL  --   --   --   --  6 8   BILIRUBIN TOTAL mg/dL  --   --   --   --  0 80   ALK PHOS U/L  --   --   --   --  65   ALT U/L  --   --   --   --  25   AST U/L  --   --   --   --  27   GLUCOSE RANDOM mg/dL  --   --  159*  < > 109   < > = values in this interval not displayed  Results from last 7 days  Lab Units 11/09/17  1026   INR  1 16       * I Have Reviewed All Lab Data Listed Above  * Additional Pertinent Lab Tests Reviewed: All Labs Within Last 24 Hours Reviewed    Imaging:    Imaging Reports Reviewed Today Include:   Cultures:   Blood Culture:   Lab Results   Component Value Date    BLOODCX No Growth After 5 Days  11/27/2016    BLOODCX No Growth After 5 Days  11/27/2016    BLOODCX No Growth After 5 Days  11/22/2016    BLOODCX No Growth After 5 Days   11/22/2016     Urine Culture:   Lab Results   Component Value Date    URINECX 20,000-29,000 cfu/ml Mixed Contaminants X4 11/27/2016    URINECX 40,000-49,000 cfu/ml Mixed Contaminants X3 11/22/2016    URINECX No Growth <1000 cfu/mL 11/21/2016    URINECX 10,000-19,000 cfu/ml Mixed Contaminants X3 06/17/2016     Sputum Culture: No components found for: SPUTUMCX  Wound Culture: No results found for: WOUNDCULT    Last 24 Hours Medication List:     allopurinol 300 mg Oral Daily   calcitriol 0 25 mcg Oral Daily   carbidopa-levodopa 1 tablet Per NG Tube TID   cyanocobalamin 1,000 mcg Oral Daily   dexamethasone 4 mg Intravenous Q6H [address]   dexamethasone 4 mg Intravenous Q6H [address]   docusate sodium 100 mg Oral BID   fluticasone 1 spray Each Nare BID   heparin (porcine) 5,000 Units Subcutaneous Q12H [address]ZET[address]   levETIRAcetam 500 mg Intravenous Q12H [address]   levothyroxine 75 mcg Per NG Tube Early Morning   metoprolol tartrate 12 5 mg Per NG Tube Q12H [address]   montelukast 10 mg Per NG Tube HS   polyethylene glycol 17 g Oral Daily   rOPINIRole 0 5 mg Per NG Tube TID   senna 1 tablet Oral Daily        Today, Patient Was Seen By: Dorothea Palacio MD    ** Please Note: Dragon 360 Dictation voice to text software may have been used in the creation of this document   **

## 2017-11-13 VITALS
WEIGHT: 172.62 LBS | DIASTOLIC BLOOD PRESSURE: 72 MMHG | HEART RATE: 45 BPM | BODY MASS INDEX: 31.77 KG/M2 | RESPIRATION RATE: 16 BRPM | OXYGEN SATURATION: 97 % | HEIGHT: 62 IN | SYSTOLIC BLOOD PRESSURE: 178 MMHG | TEMPERATURE: 98.6 F

## 2017-11-13 PROBLEM — I47.2 VENTRICULAR TACHYARRHYTHMIA (HCC): Status: RESOLVED | Noted: 2017-11-11 | Resolved: 2017-11-13

## 2017-11-13 PROBLEM — R29.810 FACIAL DROOP: Status: RESOLVED | Noted: 2017-11-09 | Resolved: 2017-11-13

## 2017-11-13 RX ORDER — ACETAMINOPHEN 500 MG
TABLET ORAL
Qty: 20 TABLET | Refills: 0 | Status: SHIPPED | OUTPATIENT
Start: 2017-11-13

## 2017-11-13 RX ADMIN — MORPHINE SULFATE 10 MG: 100 SOLUTION ORAL at 05:32

## 2017-11-13 RX ADMIN — MORPHINE SULFATE 5 MG: 100 SOLUTION ORAL at 02:30

## 2017-11-13 RX ADMIN — MORPHINE SULFATE 5 MG: 100 SOLUTION ORAL at 09:55

## 2017-11-13 RX ADMIN — LEVETIRACETAM 500 MG: 100 INJECTION, SOLUTION INTRAVENOUS at 09:54

## 2017-11-13 NOTE — SOCIAL WORK
Cm reviewed patient during care coordination rounds  Patient is medically stable for d/c today  Cm confirmed with facility admission liaison Raquel Blair, that patient is cleared to admit back to [address] [place] [address],[address]  Cm also confirmed with  communications, that patient is still set for a 12pm BLS transport  Cm informed patient's dtr Janene Hernandez, and dtr in-law Estella via phone about discharge plan  Cm left a voice message for patient's son Zee Mclaughlin about discharge plan  All parties remain in agreement with discharge plan

## 2017-11-13 NOTE — DISCHARGE SUMMARY
Discharge Summary - [facility] Hospitalist Service - Internal Medicine      Patient Information: Amee Benedict 75 y o  female MRN: [id number]  [facility]: [id number]    Discharging Physician / Practitioner: Alicia Juarez MD  PCP: Gary Cabrera MD  Admission Date: 11/9/2017  Discharge Date: 11/13/17      Reason for Admission:  Confusion and somnolence    Discharge Diagnoses:     Principal Problem:    Brain mass    Active Problems:    Chronic kidney disease, stage III (moderate)    Hypothyroidism    Symptomatic [facility] disease ([facility] )    Gout    Hypertension    Hypernatremia    Dysphagia    Acute encephalopathy    Class 1 obesity in adult    Diastolic heart failure ([facility] )     Resolved Problems:    Facial droop    Ventricular tachyarrhythmia [facility])        Consultations During Hospital Stay:  · Neurosurgery  · Gynecology  · Cardiology  · [facility] Course:     Amee Benedict is a 75 y o  female patient who originally presented to the hospital on 11/9/2017 due to worsening confusion with lethargy as a transfer from the [employer] [facility]  An initial CT of the head revealed masses with surrounding vasogenic edema confirmed on MRI of the brain and varying from 13-35 mm with a centralized necrosis and minor surrounding hemorrhaging  It looked strongly suspicious for metastatic disease without a known primary etiology at the time  Subsequently, a CT of the chest/abdomen/pelvis was ordered which was essentially unremarkable with the exception of questionable endometrial thickening for which cardiology was initially consulted to further assess for possibility of endometrial carcinoma  The patient also has a history of Parkinson's disease and is chronically on Sinemet therapy  It was noted that the patient was quite malnourished and it was unknown when last time the patient received oral nutrition was per nursing home staff    The patient had a swallow/speech evaluation done and was deemed unable to swallow even the simplest texture of nutrition  For this reason, an NG tube was initially placed and she was started on tube feeding  Discussions occurred with family members and due to her prior/multiple comorbidities including the severity of vasogenic edema surrounding her brain, the patient was made comfort measures  Neurosurgery agreed with the initial management consisting of IV Decadron to reduce intracranial swelling  The patient also presented with hypernatremia likely due to malnutrition and dehydration for which she started by the admission team on normal saline and switched over the following day to 0 45% normal saline and eventually to 5% dextrose with 0 2% normal saline infusion which did improve her sodium levels back down towards normal range  She was more alert towards the latter half for hospitalization although she still remained intermittently lethargic coupled with her pre-existing and Parkinson's dementia  Be IV fluids did improve her renal function however  The patient was seen by general gynecology who did agree that a possible biopsy/D&C of her uterus was not of much benefit considering her age and other comorbidities  Also, if a definitive diagnosis of endometrial cancer was made, the patient would definitely not be a viable candidate for chemotherapy either  The family agreed to this and the decision was made not to involve gynecology-oncology in her care  Overnight on 11/11 there was a question of possible episodes of ventricular tachycardia on the telemetry monitor for which Cardiology was consulted  Per Cardiology, this was just deemed due to artifact and there is no need for further workup  Her symptomatic relief of pain was managed by palliative care who did make their recommendations on an analgesic regimen on discharge to the hospice facility  Subsequently, the patient was discharged on 11/13 with no further interventions planned        Condition at Discharge: stable       Discharge Day Visit / Exam:     Vitals: Blood Pressure: (!) 178/72 (11/12/17 1100)  Pulse: (!) 45 (11/12/17 1100)  Temperature: 98 6 °F (37 °C) (11/12/17 1100)  Temp Source: Axillary (11/12/17 1100)  Respirations: 16 (11/12/17 1100)  Height: 5' 2" (157 5 cm) (11/13/17 0700)  Weight - Scale: 78 3 kg (172 lb 9 9 oz) (11/13/17 0700)  SpO2: 97 % (11/12/17 1100)      Physical exam - I had a face-to-face encounter with the patient on day of discharge  Discussion with Patient and/or Family:  Family agrees to hospice  Discharge instructions/Information to Patient and/or Family:   See after visit summary for information provided to patient and family  Provisions for Follow-Up Care:  See after visit summary for information related to follow-up care and any pertinent home health orders  Disposition:     Outside Facility for Hospice          Discharge Statement:  I spent 38 minutes discharging the patient  This time was spent on the day of discharge  I had direct contact with the patient on the day of discharge  Greater than 50% of the total time was spent examining patient, answering all patient questions, arranging and discussing plan of care with patient as well as directly providing post-discharge instructions  Additional time then spent on discharge activities  Discharge Medications:  See after visit summary for reconciled discharge medications provided to patient and family        ** Please Note: This note has been constructed using a voice recognition system **

## 2017-11-13 NOTE — DISCHARGE INSTR - DIET
Pt seen upon admit, unable to tolerate any po w/ profound dysphagia  Noted ongoing decline since adm on 11//9/17, pt now hospice services  Continues w/ decreased ability to take po, offer small amts of puree/thick vs thin or what the pt desires

## 2018-01-11 NOTE — RESULT NOTES
Verified Results  (1) CK (CPK) [id number] 06:00AM [facility]     Test Name Result Flag Reference   CK (CPK) 59 U/L       (1) BASIC METABOLIC PROFILE [id number] 06:00AM Tara Johnson [facility] Kidney Disease Education Program recommendations are as follows:  GFR calculation is accurate only with a steady state creatinine  Chronic Kidney disease less than 60 ml/min/1 73 sq  meters  Kidney failure less than 15 ml/min/1 73 sq  meters  Test Name Result Flag Reference   GLUCOSE,RANDM 103 mg/dL     If the patient is fasting, the ADA then defines impaired fasting glucose as > 100 mg/dL and diabetes as > or equal to 123 mg/dL     SODIUM 142 mmol/L  136-145   POTASSIUM 4 1 mmol/L  3 5-5 3   CHLORIDE 106 mmol/L  100-108   CARBON DIOXIDE 25 mmol/L  21-32   ANION GAP (CALC) 11 mmol/L  4-13   BLOOD UREA NITROGEN 57 mg/dL H 5-25   CREATININE 1 62 mg/dL H 0 60-1 30   Standardized to IDMS reference method   CALCIUM 9 2 mg/dL  8 3-10 1   eGFR Non-African American 31 8 ml/min/1 73sq m       (1) PTH N-TERMINAL (INTACT) [id number] 06:00AM [facility]     Test Name Result Flag Reference   PARATHYROID HORMONE INTACT 107 3 pg/mL H 14 0-72 0

## 2018-01-12 NOTE — PROCEDURES
Procedures by Nisha Rivera MD at 11/29/2016  1:02 PM      Author:  Nisha Rivera MD Service:  Interventional Radiology  Author Type:  Physician     Filed:  11/29/2016  1:04 PM Date of Service:  11/29/2016  1:02 PM Status:  Signed     :  Nisha Rivera MD (Physician)         Procedure Orders:       1  Insert PICC line [id number] ordered by Nisha Rivera MD at 11/29/16 1302                    PICC Line Insertion  Date/Time: 11/29/2016 1:02 PM  Performed by: Agueda Sampson  Authorized by: Agueda Sampson     Patient location:  Other (comment) (Radiology)  Other  Assisting Provider: No    Consent:     Consent obtained:  Written    Consent given by:  Patient  Universal protocol:     Procedure explained and questions answered to patient or proxy's satisfaction: yes      Relevant documents present and verified: yes      Test results available and properly labeled: yes       Imaging studies available: yes      Required blood products, implants, devices, and special equipment available: yes      Site/side marked: yes      Immediately prior to procedure, a time out was called: yes      Patient identity confirmed:  Verbally with patient  Pre-procedure details:     Hand hygiene: Hand hygiene performed prior to insertion      Sterile barrier technique: All elements of maximal sterile technique followed      Skin preparation:  ChloraPrep  Anesthesia (see MAR for exact dosages):      Anesthesia method:  Local infiltration    Local anesthetic:  Lidocaine 1% w/o epi  Procedure details:     Location:  Basilic    Vessel type: vein      Laterality:  Right    Approach: percutaneous technique used      Patient position:  Flat    Procedural supplies:  Double lumen    Catheter size:  5 5 Fr    Landmarks identified: yes      Ultrasound guidance: yes      Sterile ultrasound techniques: Sterile gel and sterile probe covers were used      Number of attempts:  1    Successful placement: yes Vessel of catheter tip end:  SVC    Total catheter length (cm):  30    Catheter out on skin (cm):  0  Post-procedure details:     Post-procedure:  Dressing applied    Assessment:  Blood return through all ports and placement verified by x-ray    Post-procedure complications: none      Patient tolerance of procedure:   Tolerated well, no immediate complications                 Received for:Provider  EPIC   Nov 29 2016  1:04PM [facility] Standard Time

## 2024-02-23 NOTE — PROGRESS NOTES
[facility]  Patient: Rupert العلي 75 y o  female   MRN: [id number]  PCP: Fredo Mike MD  [facility] Encounter: [id number]  Date Of Visit: 11/10/17         Subjective:   Seen and examined early this morning  Patient is somewhat anxious and scared about her overall diagnosis  She is confused due to longstanding Parkinson's dementia  Denies any pain at this time  Denies any headaches or blurred vision at this time  She was seen by speech therapy who agrees to keep her NPO and subsequently, a nasogastric tube was inserted to initiate tube feeding for nutrition  Objective:     Vitals:   Temp (24hrs), Av 3 °F (36 8 °C), Min:97 8 °F (36 6 °C), Max:99 °F (37 2 °C)    HR:  [68-77] 70  Resp:  [17-20] 17  BP: (145-182)/(68-90) 148/90  SpO2:  [95 %-97 %] 96 %  Body mass index is 31 57 kg/m²  Input and Output Summary (last 24 hours):        Intake/Output Summary (Last 24 hours) at 11/10/17 1722  Last data filed at 11/10/17 1322   Gross per 24 hour   Intake              690 ml   Output              299 ml   Net              391 ml       Physical Exam:     GENERAL:  Well-developed/nourished - no acute distress  HEAD:  Normocephalic - atraumatic  EYES: PERRL - EOMI    MOUTH:  Mucosa somewhat dry  NECK:  Supple - full range of motion  CARDIAC:  Regular rate/rhythm - S1/S2 positive  PULMONARY:  Clear but mildly diminished breath sounds bilaterally - nonlabored respirations  ABDOMEN:  Soft - nontender/nondistended - active bowel sounds  MUSCULOSKELETAL:  Motor strength/range of motion deconditioned  NEUROLOGIC:  Lethargic but oriented x to place/time only currently - stable left facial droop - no resting tremors noted - LUE/LLE weakness  SKIN:  Chronic wrinkles/blemishes noted  PSYCHIATRIC:  Mood/affect age appropriate      Additional Data:     Labs & Recent Cultures:       Results from last 7 days  Lab Units 11/10/17  0702 17  1026 WBC Thousand/uL 9 71 7 10   HEMOGLOBIN g/dL 13 0 12 9   HEMATOCRIT % 39 0 40 2   PLATELETS Thousands/uL 170 202   NEUTROS PCT %  --  73   LYMPHS PCT %  --  10*   MONOS PCT %  --  6   EOS PCT %  --  10*       Results from last 7 days  Lab Units 11/10/17  0702 11/09/17  1026   SODIUM mmol/L 151* 151*   POTASSIUM mmol/L 4 2 3 6   CHLORIDE mmol/L 120* 118*   CO2 mmol/L 20* 24   BUN mg/dL 32* 33*   CREATININE mg/dL 1 46* 1 44*   CALCIUM mg/dL 9 5 9 2   TOTAL PROTEIN g/dL  --  6 8   BILIRUBIN TOTAL mg/dL  --  0 80   ALK PHOS U/L  --  65   ALT U/L  --  25   AST U/L  --  27   GLUCOSE RANDOM mg/dL 128 109       Results from last 7 days  Lab Units 11/09/17  1026   INR  1 16                 Last 24 Hours Medication List:     allopurinol 300 mg Oral Daily   calcitriol 0 25 mcg Oral Daily   carbidopa-levodopa 1 tablet Oral TID   cyanocobalamin 1,000 mcg Oral Daily   dexamethasone 4 mg Intravenous Q6H SUZETTE   docusate sodium 100 mg Oral BID   fluticasone 1 spray Each Nare BID   levothyroxine 75 mcg Oral Early Morning   montelukast 10 mg Oral HS   polyethylene glycol 17 g Oral Daily   rOPINIRole 0 5 mg Oral TID   senna 1 tablet Oral Daily         Assessments:    1  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 - Local Brain Compression  Plan:   · On MRI brain varying from 13-35 mm   · Appreciate neurosurgery evaluation - continue IV Decadron regimen  · Not a candidate for surgical intervention due to multiple medical comorbidities   · Will appreciate palliative Care input     2  Hypernatremia   Plan:   · Due to dehydration - c/w 0 45%NS (switched to this concentration this morning as no improvement was seen with 0 9%NS started by admitting team last night) - if no improvement seen over the course of the day, then may need to start D5 w/ 0 2% fluid infusion at that time - monitor serial serum sodium levels   · Nephrology input to be appreciated     3    Acute Kidney Injury   Plan:   · Improved w/ IV fluids - likely due to poor nutritional/fluid intake   · Held Lisinopril    4  Progressive Dysphagia - Protein-Calorie Malnutrition w/ Hypoalbuminemia   Plan:   · Appreciate speech/swallow evaluation   · Insert NG tube today and once placement confirmed via XR will start tube feeds   · appreciate nutritionist evaluation for tube feeding initiation (Jevity 1 0 @ 50 mL/hr w/ 150 mL free water flushes Q4h   · Length of nutrition deprivation unknown at this time (per nursing home as well)     5  Parkinson's Dementia   Plan:   · C/w Sinemet/Ropinirole   · Compliance at nursing home unsure due to unknown duration of progressive dysphagia     6  Hypothyroidism  Plan:   · C/w Synthroid at current dose - TSH within normal limits    7  Endometrial thickening  Plan:   · Will appreciate gynecology input - should consider biopsy w/ D&C (if patient can tolerate) versus MRI pelvis w/ contrast once renal function stabilizes   · If malignancy diagnosis is made, there is also a question of whether or not the patient can even tolerate chemotherapy w/ her other comorbidities - consider gynecologic-oncology evaluation        VTE Prophylaxis:  SCDs       Time Spent for Care: 36 minutes  More than 50% of total time spent on counseling and coordination of care as described above  Current Length of Stay: 1 day(s)      Code Status: Level 1 - Full Code       Today, Patient Was Seen By: Josiane Washington MD    ** Please Note: This note has been constructed using a voice recognition system   ** No